# Patient Record
Sex: FEMALE | Race: WHITE | NOT HISPANIC OR LATINO | Employment: FULL TIME | URBAN - METROPOLITAN AREA
[De-identification: names, ages, dates, MRNs, and addresses within clinical notes are randomized per-mention and may not be internally consistent; named-entity substitution may affect disease eponyms.]

---

## 2017-01-14 ENCOUNTER — HOSPITAL ENCOUNTER (EMERGENCY)
Facility: HOSPITAL | Age: 32
Discharge: HOME | End: 2017-01-14
Admitting: EMERGENCY MEDICINE
Payer: COMMERCIAL

## 2017-01-14 VITALS
HEIGHT: 61 IN | OXYGEN SATURATION: 98 % | SYSTOLIC BLOOD PRESSURE: 146 MMHG | RESPIRATION RATE: 20 BRPM | DIASTOLIC BLOOD PRESSURE: 87 MMHG | TEMPERATURE: 97.4 F | WEIGHT: 120 LBS | BODY MASS INDEX: 22.66 KG/M2 | HEART RATE: 54 BPM

## 2017-01-14 DIAGNOSIS — K08.89 TOOTHACHE: Primary | ICD-10-CM

## 2017-01-14 PROCEDURE — 99283 EMERGENCY DEPT VISIT LOW MDM: CPT

## 2017-01-14 RX ORDER — NAPROXEN 500 MG/1
500 TABLET ORAL ONCE
Status: COMPLETED | OUTPATIENT
Start: 2017-01-14 | End: 2017-01-14

## 2017-01-14 RX ORDER — NAPROXEN 500 MG/1
500 TABLET ORAL 2 TIMES DAILY WITH MEALS
Qty: 60 TABLET | Refills: 0 | Status: SHIPPED | OUTPATIENT
Start: 2017-01-14 | End: 2017-02-13

## 2017-01-14 RX ORDER — TRAMADOL HYDROCHLORIDE 50 MG/1
50 TABLET ORAL EVERY 6 HOURS PRN
Qty: 6 TABLET | Refills: 0 | Status: SHIPPED | OUTPATIENT
Start: 2017-01-14 | End: 2017-01-24

## 2017-01-14 RX ORDER — PENICILLIN V POTASSIUM 500 MG/1
500 TABLET ORAL 4 TIMES DAILY
Qty: 40 TABLET | Refills: 0 | Status: SHIPPED | OUTPATIENT
Start: 2017-01-14 | End: 2017-01-24

## 2017-01-14 RX ADMIN — NAPROXEN 500 MG: 500 TABLET ORAL at 16:11

## 2017-07-25 ENCOUNTER — ALLSCRIPTS OFFICE VISIT (OUTPATIENT)
Dept: OTHER | Facility: OTHER | Age: 32
End: 2017-07-25

## 2017-07-25 ENCOUNTER — GENERIC CONVERSION - ENCOUNTER (OUTPATIENT)
Dept: OTHER | Facility: OTHER | Age: 32
End: 2017-07-25

## 2018-01-12 NOTE — MISCELLANEOUS
Message  Return to work or school:  Tray Obando is under my professional care  She was seen in my office on 7/25/2017  Please excuse form work 7/25/2017            Zane Alicea MD       Signatures   Electronically signed by : Zane Alicea MD; Jul 26 2017  3:25PM EST                       (Author)

## 2018-01-14 VITALS
TEMPERATURE: 98.6 F | BODY MASS INDEX: 20.12 KG/M2 | SYSTOLIC BLOOD PRESSURE: 110 MMHG | HEART RATE: 60 BPM | HEIGHT: 61 IN | WEIGHT: 106.6 LBS | DIASTOLIC BLOOD PRESSURE: 70 MMHG

## 2018-11-17 ENCOUNTER — OFFICE VISIT (OUTPATIENT)
Dept: URGENT CARE | Facility: CLINIC | Age: 33
End: 2018-11-17
Payer: COMMERCIAL

## 2018-11-17 VITALS
HEIGHT: 61 IN | WEIGHT: 121 LBS | HEART RATE: 68 BPM | BODY MASS INDEX: 22.84 KG/M2 | TEMPERATURE: 97.2 F | SYSTOLIC BLOOD PRESSURE: 115 MMHG | DIASTOLIC BLOOD PRESSURE: 73 MMHG

## 2018-11-17 DIAGNOSIS — K04.7 DENTAL ABSCESS: Primary | ICD-10-CM

## 2018-11-17 PROCEDURE — G0382 LEV 3 HOSP TYPE B ED VISIT: HCPCS | Performed by: PHYSICIAN ASSISTANT

## 2018-11-17 PROCEDURE — 99283 EMERGENCY DEPT VISIT LOW MDM: CPT | Performed by: PHYSICIAN ASSISTANT

## 2018-11-17 RX ORDER — NAPROXEN 500 MG/1
500 TABLET ORAL 2 TIMES DAILY WITH MEALS
Qty: 14 TABLET | Refills: 0 | Status: SHIPPED | OUTPATIENT
Start: 2018-11-17 | End: 2019-05-28 | Stop reason: ALTCHOICE

## 2018-11-17 RX ORDER — PENICILLIN V POTASSIUM 500 MG/1
500 TABLET ORAL EVERY 8 HOURS SCHEDULED
Qty: 30 TABLET | Refills: 0 | Status: SHIPPED | OUTPATIENT
Start: 2018-11-17 | End: 2018-11-27

## 2018-11-17 NOTE — PATIENT INSTRUCTIONS
Take antibiotic as directed  Yogurt avoid GI upset  Take naproxen as directed for pain  The food medication  Follow up with dentist on 11/28  Follow up with PCP in 1-2 days  Go to the ER for worsening symptoms

## 2018-11-17 NOTE — PROGRESS NOTES
[facility]        NAME: Alba Whitehead is a 29 y o  female  : 1985    MRN: [id number]      Assessment and Plan   Dental abscess [K04 7]  1  Dental abscess  naproxen (NAPROSYN) 500 mg tablet    penicillin V potassium (VEETID) 500 mg tablet         Patient Instructions     Patient Instructions   Take antibiotic as directed  Yogurt avoid GI upset  Take naproxen as directed for pain  The food medication  Follow up with dentist on   Follow up with PCP in 1-2 days  Go to the ER for worsening symptoms  Chief Complaint     Chief Complaint   Patient presents with    Jaw Pain     left side jaw pain from a broken tooth since monday         History of Present Illness       Dental Pain    This is a new problem  The current episode started yesterday  The problem occurs constantly  The problem has been unchanged  The pain is at a severity of 5/10  The pain is moderate  Associated symptoms include facial pain, sinus pressure and thermal sensitivity  Pertinent negatives include no difficulty swallowing, fever or oral bleeding  Associated symptoms comments: Patient reports she has not been the dentist since she was a teenager  Pain is located in upper left molars  Patient reports she believes the 1 tooth has slowly been breaking off  Patient reports she has an appointment with a plus denture see on   Jeimy Randall She has tried NSAIDs for the symptoms  The treatment provided mild relief  Review of Systems   Review of Systems   Constitutional: Negative for chills, fatigue and fever  HENT: Positive for dental problem and sinus pressure  Negative for congestion, ear pain, hearing loss, postnasal drip, sinus pain and sore throat  Eyes: Negative for pain and discharge  Respiratory: Negative for chest tightness and shortness of breath  Cardiovascular: Negative for chest pain  Gastrointestinal: Negative for abdominal pain, constipation, nausea and vomiting     Genitourinary: Negative for difficulty urinating  Musculoskeletal: Negative for arthralgias and myalgias  Skin: Negative for rash  Neurological: Negative for dizziness and headaches  Psychiatric/Behavioral: Negative for behavioral problems  Current Medications       Current Outpatient Prescriptions:     naproxen (NAPROSYN) 500 mg tablet, Take 1 tablet (500 mg total) by mouth 2 (two) times a day with meals for 7 days, Disp: 14 tablet, Rfl: 0    penicillin V potassium (VEETID) 500 mg tablet, Take 1 tablet (500 mg total) by mouth every 8 (eight) hours for 10 days, Disp: 30 tablet, Rfl: 0    Current Allergies     Allergies as of 11/17/2018    (No Known Allergies)            The following portions of the patient's history were reviewed and updated as appropriate: allergies, current medications, past family history, past medical history, past social history, past surgical history and problem list      Past Medical History:   Diagnosis Date    Cervical dysplasia, mild        No past surgical history on file  No family history on file  Medications have been verified  Objective   /73   Pulse 68   Temp (!) 97 2 °F (36 2 °C)   Ht 5' 1" (1 549 m)   Wt 54 9 kg (121 lb)   BMI 22 86 kg/m²        Physical Exam     Physical Exam   Constitutional: She is oriented to person, place, and time  She appears well-developed and well-nourished  HENT:   Head:       Mouth/Throat: Oropharynx is clear and moist and mucous membranes are normal  She does not have dentures  No oral lesions  Normal dentition  No dental abscesses or dental caries  Eyes: Pupils are equal, round, and reactive to light  Neck: Normal range of motion  Cardiovascular: Normal rate, regular rhythm and normal heart sounds  No murmur heard  Pulmonary/Chest: Effort normal and breath sounds normal  No respiratory distress  Neurological: She is alert and oriented to person, place, and time  Psychiatric: She has a normal mood and affect  Nursing note and vitals reviewed

## 2019-05-28 ENCOUNTER — OFFICE VISIT (OUTPATIENT)
Dept: FAMILY MEDICINE CLINIC | Facility: HOSPITAL | Age: 34
End: 2019-05-28
Payer: COMMERCIAL

## 2019-05-28 VITALS
DIASTOLIC BLOOD PRESSURE: 72 MMHG | WEIGHT: 125.8 LBS | SYSTOLIC BLOOD PRESSURE: 118 MMHG | BODY MASS INDEX: 23.75 KG/M2 | HEIGHT: 61 IN | TEMPERATURE: 97.3 F | HEART RATE: 54 BPM

## 2019-05-28 DIAGNOSIS — Z13.220 SCREENING CHOLESTEROL LEVEL: ICD-10-CM

## 2019-05-28 DIAGNOSIS — R53.83 FATIGUE, UNSPECIFIED TYPE: ICD-10-CM

## 2019-05-28 DIAGNOSIS — Z00.00 ANNUAL PHYSICAL EXAM: Primary | ICD-10-CM

## 2019-05-28 PROCEDURE — 99395 PREV VISIT EST AGE 18-39: CPT | Performed by: NURSE PRACTITIONER

## 2019-05-28 RX ORDER — MULTIVIT-MIN/IRON FUM/FOLIC AC 7.5 MG-4
1 TABLET ORAL DAILY
COMMUNITY
End: 2020-04-20

## 2020-04-20 ENCOUNTER — TELEMEDICINE (OUTPATIENT)
Dept: FAMILY MEDICINE CLINIC | Facility: HOSPITAL | Age: 35
End: 2020-04-20
Payer: COMMERCIAL

## 2020-04-20 VITALS — BODY MASS INDEX: 23.41 KG/M2 | WEIGHT: 124 LBS | HEIGHT: 61 IN

## 2020-04-20 DIAGNOSIS — Z20.828 EXPOSURE TO SARS-ASSOCIATED CORONAVIRUS: Primary | ICD-10-CM

## 2020-04-20 DIAGNOSIS — R19.7 DIARRHEA, UNSPECIFIED TYPE: ICD-10-CM

## 2020-04-20 DIAGNOSIS — Z20.828 EXPOSURE TO SARS-ASSOCIATED CORONAVIRUS: ICD-10-CM

## 2020-04-20 DIAGNOSIS — R53.83 OTHER FATIGUE: ICD-10-CM

## 2020-04-20 PROCEDURE — 99214 OFFICE O/P EST MOD 30 MIN: CPT | Performed by: NURSE PRACTITIONER

## 2020-04-20 PROCEDURE — 87635 SARS-COV-2 COVID-19 AMP PRB: CPT

## 2020-04-21 LAB — SARS-COV-2 RNA SPEC QL NAA+PROBE: NOT DETECTED

## 2020-04-24 ENCOUNTER — TELEPHONE (OUTPATIENT)
Dept: FAMILY MEDICINE CLINIC | Facility: HOSPITAL | Age: 35
End: 2020-04-24

## 2020-04-24 LAB
1,25(OH)2D3 SERPL-MCNC: 65.2 PG/ML (ref 19.9–79.3)
ALBUMIN SERPL-MCNC: 4.9 G/DL (ref 3.8–4.8)
ALBUMIN/GLOB SERPL: 2.5 {RATIO} (ref 1.2–2.2)
ALP SERPL-CCNC: 20 IU/L (ref 39–117)
ALT SERPL-CCNC: 15 IU/L (ref 0–32)
AST SERPL-CCNC: 18 IU/L (ref 0–40)
BASOPHILS # BLD AUTO: 0 X10E3/UL (ref 0–0.2)
BASOPHILS NFR BLD AUTO: 1 %
BILIRUB SERPL-MCNC: 0.4 MG/DL (ref 0–1.2)
BUN SERPL-MCNC: 7 MG/DL (ref 6–20)
BUN/CREAT SERPL: 10 (ref 9–23)
CALCIUM SERPL-MCNC: 9.7 MG/DL (ref 8.7–10.2)
CHLORIDE SERPL-SCNC: 102 MMOL/L (ref 96–106)
CHOLEST SERPL-MCNC: 179 MG/DL (ref 100–199)
CHOLEST/HDLC SERPL: 3.1 RATIO (ref 0–4.4)
CO2 SERPL-SCNC: 25 MMOL/L (ref 20–29)
CREAT SERPL-MCNC: 0.69 MG/DL (ref 0.57–1)
EOSINOPHIL # BLD AUTO: 0.1 X10E3/UL (ref 0–0.4)
EOSINOPHIL NFR BLD AUTO: 1 %
ERYTHROCYTE [DISTWIDTH] IN BLOOD BY AUTOMATED COUNT: 12.6 % (ref 11.7–15.4)
FERRITIN SERPL-MCNC: 78 NG/ML (ref 15–150)
GLOBULIN SER-MCNC: 2 G/DL (ref 1.5–4.5)
GLUCOSE SERPL-MCNC: 116 MG/DL (ref 65–99)
HCT VFR BLD AUTO: 38.3 % (ref 34–46.6)
HDLC SERPL-MCNC: 57 MG/DL
HGB BLD-MCNC: 13.2 G/DL (ref 11.1–15.9)
IMM GRANULOCYTES # BLD: 0 X10E3/UL (ref 0–0.1)
IMM GRANULOCYTES NFR BLD: 0 %
LDLC SERPL CALC-MCNC: 109 MG/DL (ref 0–99)
LYMPHOCYTES # BLD AUTO: 1.3 X10E3/UL (ref 0.7–3.1)
LYMPHOCYTES NFR BLD AUTO: 22 %
MCH RBC QN AUTO: 30.3 PG (ref 26.6–33)
MCHC RBC AUTO-ENTMCNC: 34.5 G/DL (ref 31.5–35.7)
MCV RBC AUTO: 88 FL (ref 79–97)
MONOCYTES # BLD AUTO: 0.5 X10E3/UL (ref 0.1–0.9)
MONOCYTES NFR BLD AUTO: 8 %
NEUTROPHILS # BLD AUTO: 4 X10E3/UL (ref 1.4–7)
NEUTROPHILS NFR BLD AUTO: 68 %
PLATELET # BLD AUTO: 246 X10E3/UL (ref 150–450)
POTASSIUM SERPL-SCNC: 4.4 MMOL/L (ref 3.5–5.2)
PROT SERPL-MCNC: 6.9 G/DL (ref 6–8.5)
RBC # BLD AUTO: 4.36 X10E6/UL (ref 3.77–5.28)
SL AMB EGFR AFRICAN AMERICAN: 132 ML/MIN/1.73
SL AMB EGFR NON AFRICAN AMERICAN: 115 ML/MIN/1.73
SL AMB VLDL CHOLESTEROL CALC: 13 MG/DL (ref 5–40)
SODIUM SERPL-SCNC: 140 MMOL/L (ref 134–144)
TRIGL SERPL-MCNC: 64 MG/DL (ref 0–149)
TSH SERPL DL<=0.005 MIU/L-ACNC: 1.38 UIU/ML (ref 0.45–4.5)
VIT B12 SERPL-MCNC: 416 PG/ML (ref 232–1245)
WBC # BLD AUTO: 5.9 X10E3/UL (ref 3.4–10.8)

## 2020-05-08 ENCOUNTER — TELEMEDICINE (OUTPATIENT)
Dept: FAMILY MEDICINE CLINIC | Facility: HOSPITAL | Age: 35
End: 2020-05-08
Payer: COMMERCIAL

## 2020-05-08 DIAGNOSIS — R51.9 ACUTE NONINTRACTABLE HEADACHE, UNSPECIFIED HEADACHE TYPE: Primary | ICD-10-CM

## 2020-05-08 PROCEDURE — 99213 OFFICE O/P EST LOW 20 MIN: CPT | Performed by: FAMILY MEDICINE

## 2020-05-12 ENCOUNTER — TELEPHONE (OUTPATIENT)
Dept: BEHAVIORAL/MENTAL HEALTH CLINIC | Facility: CLINIC | Age: 35
End: 2020-05-12

## 2020-05-12 ENCOUNTER — TELEMEDICINE (OUTPATIENT)
Dept: FAMILY MEDICINE CLINIC | Facility: HOSPITAL | Age: 35
End: 2020-05-12
Payer: COMMERCIAL

## 2020-05-12 VITALS — TEMPERATURE: 97.1 F | HEIGHT: 61 IN | WEIGHT: 124 LBS | BODY MASS INDEX: 23.41 KG/M2

## 2020-05-12 DIAGNOSIS — F41.1 GAD (GENERALIZED ANXIETY DISORDER): Primary | ICD-10-CM

## 2020-05-12 DIAGNOSIS — F32.0 CURRENT MILD EPISODE OF MAJOR DEPRESSIVE DISORDER WITHOUT PRIOR EPISODE (HCC): ICD-10-CM

## 2020-05-12 PROCEDURE — 99214 OFFICE O/P EST MOD 30 MIN: CPT | Performed by: NURSE PRACTITIONER

## 2020-05-12 RX ORDER — ESCITALOPRAM OXALATE 10 MG/1
10 TABLET ORAL DAILY
Qty: 30 TABLET | Refills: 1 | Status: SHIPPED | OUTPATIENT
Start: 2020-05-12 | End: 2020-06-15

## 2020-05-14 ENCOUNTER — TELEPHONE (OUTPATIENT)
Dept: FAMILY MEDICINE CLINIC | Facility: HOSPITAL | Age: 35
End: 2020-05-14

## 2020-06-09 ENCOUNTER — TELEPHONE (OUTPATIENT)
Dept: BEHAVIORAL/MENTAL HEALTH CLINIC | Facility: CLINIC | Age: 35
End: 2020-06-09

## 2020-06-09 ENCOUNTER — TELEMEDICINE (OUTPATIENT)
Dept: FAMILY MEDICINE CLINIC | Facility: HOSPITAL | Age: 35
End: 2020-06-09
Payer: COMMERCIAL

## 2020-06-09 VITALS — HEIGHT: 61 IN | BODY MASS INDEX: 22.66 KG/M2 | WEIGHT: 120 LBS

## 2020-06-09 DIAGNOSIS — F32.0 CURRENT MILD EPISODE OF MAJOR DEPRESSIVE DISORDER WITHOUT PRIOR EPISODE (HCC): ICD-10-CM

## 2020-06-09 DIAGNOSIS — F41.1 GAD (GENERALIZED ANXIETY DISORDER): Primary | ICD-10-CM

## 2020-06-09 PROCEDURE — 99213 OFFICE O/P EST LOW 20 MIN: CPT | Performed by: NURSE PRACTITIONER

## 2020-06-14 DIAGNOSIS — F32.0 CURRENT MILD EPISODE OF MAJOR DEPRESSIVE DISORDER WITHOUT PRIOR EPISODE (HCC): ICD-10-CM

## 2020-06-14 DIAGNOSIS — F41.1 GAD (GENERALIZED ANXIETY DISORDER): ICD-10-CM

## 2020-06-15 RX ORDER — ESCITALOPRAM OXALATE 10 MG/1
TABLET ORAL
Qty: 30 TABLET | Refills: 1 | Status: SHIPPED | OUTPATIENT
Start: 2020-06-15 | End: 2020-07-07 | Stop reason: SDUPTHER

## 2020-07-07 ENCOUNTER — TELEMEDICINE (OUTPATIENT)
Dept: FAMILY MEDICINE CLINIC | Facility: HOSPITAL | Age: 35
End: 2020-07-07
Payer: COMMERCIAL

## 2020-07-07 VITALS — BODY MASS INDEX: 23.22 KG/M2 | WEIGHT: 123 LBS | HEIGHT: 61 IN

## 2020-07-07 DIAGNOSIS — F41.1 GAD (GENERALIZED ANXIETY DISORDER): ICD-10-CM

## 2020-07-07 DIAGNOSIS — F32.0 CURRENT MILD EPISODE OF MAJOR DEPRESSIVE DISORDER WITHOUT PRIOR EPISODE (HCC): ICD-10-CM

## 2020-07-07 PROCEDURE — 3008F BODY MASS INDEX DOCD: CPT | Performed by: NURSE PRACTITIONER

## 2020-07-07 PROCEDURE — 99214 OFFICE O/P EST MOD 30 MIN: CPT | Performed by: NURSE PRACTITIONER

## 2020-07-07 PROCEDURE — 1036F TOBACCO NON-USER: CPT | Performed by: NURSE PRACTITIONER

## 2020-07-07 RX ORDER — ESCITALOPRAM OXALATE 20 MG/1
10 TABLET ORAL DAILY
Qty: 30 TABLET | Refills: 1 | Status: SHIPPED | OUTPATIENT
Start: 2020-07-07 | End: 2020-07-10 | Stop reason: SDUPTHER

## 2020-07-07 NOTE — ASSESSMENT & PLAN NOTE
Increased anxiety  Increase Lexapro to 20 mg  Advise she f/u with therapy  Can trial magnesium glycinate nightly  Continue with daily exercise  F/U in 4 weeks

## 2020-07-07 NOTE — PROGRESS NOTES
Virtual Regular Visit      Assessment/Plan:    Problem List Items Addressed This Visit        Other    SARAH (generalized anxiety disorder)     Increased anxiety  Increase Lexapro to 20 mg  Advise she f/u with therapy  Can trial magnesium glycinate nightly  Continue with daily exercise  F/U in 4 weeks  Relevant Medications    escitalopram (LEXAPRO) 20 mg tablet    Current mild episode of major depressive disorder without prior episode (HCC)     Depressive symptoms controlled for now  Relevant Medications    escitalopram (LEXAPRO) 20 mg tablet               Reason for visit is   Chief Complaint   Patient presents with    Anxiety     started Saturday    Virtual Regular Visit        Encounter provider FLEX Estevez    Provider located at [address] [place] [address] MD  [address] [place] [address]      Recent Visits  No visits were found meeting these conditions  Showing recent visits within past 7 days and meeting all other requirements     Today's Visits  Date Type Provider Dept   [facility]   Showing today's visits and meeting all other requirements     Future Appointments  No visits were found meeting these conditions  Showing future appointments within next 150 days and meeting all other requirements        The patient was identified by name and date of birth  Deyaniramonserrat Durant was informed that this is a telemedicine visit and that the visit is being conducted through [employer] and patient was informed that this is not a secure, HIPAA-complaint platform  She agrees to proceed     My office door was closed  No one else was in the room  She acknowledged consent and understanding of privacy and security of the video platform  The patient has agreed to participate and understands they can discontinue the visit at any time  Patient is aware this is a billable service  Subjective  Tunde Jackson is a 36 y o  female    Started with increased anxiety a few days ago  Increased worry and fear  Had diarrhea  Went outside  Pacing  Took a few hours to feel better  Talking to sister made her feel better  Felt cloudy and foggy  Has been doing well up until a few days ago on lexapro 10 mg  No triggers to increased anxiety  Grandfather did pass away over 1 week ago  Feels hopeless  Was referred to therapy but has not followed up  Started with increased fatigue again  Poor appetite  Past Medical History:   Diagnosis Date    Cervical dysplasia, mild     Current mild episode of major depressive disorder without prior episode ([facility] [address] ) 5/12/2020    Iron deficiency anemia        Past Surgical History:   Procedure Laterality Date    BREAST SURGERY Right     Lumpectomy     COLPOSCOPY         Current Outpatient Medications   Medication Sig Dispense Refill    escitalopram (LEXAPRO) 20 mg tablet Take 0 5 tablets (10 mg total) by mouth daily 30 tablet 1     No current facility-administered medications for this visit  No Known Allergies    Review of Systems   Constitutional: Positive for appetite change and fatigue  Psychiatric/Behavioral: Negative for dysphoric mood, sleep disturbance and suicidal ideas  The patient is nervous/anxious  Video Exam    Vitals:    07/07/20 1055   Weight: 55 8 kg (123 lb)   Height: 5' 1" (1 549 m)       Physical Exam   Constitutional: She is oriented to person, place, and time  She appears well-developed and well-nourished  No distress  Neurological: She is alert and oriented to person, place, and time  Psychiatric: She has a normal mood and affect  Her behavior is normal  Judgment and thought content normal    Vitals reviewed  As a result of this visit, I have not referred the patient for further respiratory evaluation      I spent 20 minutes directly with the patient during this visit    I have spent 20 minutes with Patient today in which greater than 50% of this time was spent in counseling/coordination of care regarding Risks and benefits of tx options, Intructions for management, Importance of tx compliance, Risk factor reductions and Impressions  VIRTUAL VISIT DISCLAIMER    Damion Bo acknowledges that she has consented to an online visit or consultation  She understands that the online visit is based solely on information provided by her, and that, in the absence of a face-to-face physical evaluation by the physician, the diagnosis she receives is both limited and provisional in terms of accuracy and completeness  This is not intended to replace a full medical face-to-face evaluation by the physician  Damion Soriano understands and accepts these terms

## 2020-07-10 DIAGNOSIS — F41.1 GAD (GENERALIZED ANXIETY DISORDER): ICD-10-CM

## 2020-07-10 DIAGNOSIS — F32.0 CURRENT MILD EPISODE OF MAJOR DEPRESSIVE DISORDER WITHOUT PRIOR EPISODE (HCC): ICD-10-CM

## 2020-07-10 RX ORDER — ESCITALOPRAM OXALATE 20 MG/1
20 TABLET ORAL DAILY
Qty: 30 TABLET | Refills: 1 | Status: SHIPPED | OUTPATIENT
Start: 2020-07-10 | End: 2020-08-06 | Stop reason: SDUPTHER

## 2020-07-10 NOTE — TELEPHONE ENCOUNTER
Pt picked up rx but realized that its only half of the lexapro she needs because Aurora Isaac just upped her dosage recently, could we please call pharmacy and fix

## 2020-08-04 ENCOUNTER — TELEMEDICINE (OUTPATIENT)
Dept: BEHAVIORAL/MENTAL HEALTH CLINIC | Facility: CLINIC | Age: 35
End: 2020-08-04
Payer: COMMERCIAL

## 2020-08-04 DIAGNOSIS — F43.23 ADJUSTMENT DISORDER WITH MIXED ANXIETY AND DEPRESSED MOOD: Primary | ICD-10-CM

## 2020-08-04 PROCEDURE — 90834 PSYTX W PT 45 MINUTES: CPT | Performed by: SOCIAL WORKER
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
## 2020-08-06 ENCOUNTER — TELEMEDICINE (OUTPATIENT)
Dept: FAMILY MEDICINE CLINIC | Facility: HOSPITAL | Age: 35
End: 2020-08-06
Payer: COMMERCIAL

## 2020-08-06 VITALS — BODY MASS INDEX: 24.17 KG/M2 | HEIGHT: 61 IN | WEIGHT: 128 LBS

## 2020-08-06 DIAGNOSIS — F32.0 CURRENT MILD EPISODE OF MAJOR DEPRESSIVE DISORDER WITHOUT PRIOR EPISODE (HCC): Primary | ICD-10-CM

## 2020-08-06 DIAGNOSIS — F41.1 GAD (GENERALIZED ANXIETY DISORDER): ICD-10-CM

## 2020-08-06 PROCEDURE — 1036F TOBACCO NON-USER: CPT | Performed by: NURSE PRACTITIONER

## 2020-08-06 PROCEDURE — 99213 OFFICE O/P EST LOW 20 MIN: CPT | Performed by: NURSE PRACTITIONER

## 2020-08-06 PROCEDURE — 3008F BODY MASS INDEX DOCD: CPT | Performed by: NURSE PRACTITIONER

## 2020-08-06 RX ORDER — ESCITALOPRAM OXALATE 20 MG/1
20 TABLET ORAL DAILY
Qty: 30 TABLET | Refills: 2 | Status: SHIPPED | OUTPATIENT
Start: 2020-08-06 | End: 2020-12-08 | Stop reason: SDUPTHER

## 2020-08-06 NOTE — PROGRESS NOTES
Virtual Regular Visit      Assessment/Plan:    Problem List Items Addressed This Visit        Other    SARAH (generalized anxiety disorder)     Much improved  Continue on lexapro 20 mg    F/U in 2 months (in person)  Continue with therapy  Call with worsening mood  Current mild episode of major depressive disorder without prior episode ([facility] [address] ) - Primary     Much improved  Continue on lexapro 20 mg    F/U in 2 months (in person)  Continue with therapy  Call with worsening mood  Reason for visit is   Chief Complaint   Patient presents with    Follow-up    Virtual Regular Visit        Encounter provider FLEX Ulrich    Provider located at [address] [place] [address]  [address] [place] [address]      Recent Visits  No visits were found meeting these conditions  Showing recent visits within past 7 days and meeting all other requirements     Today's Visits  Date Type Provider Dept   [facility]   Showing today's visits and meeting all other requirements     Future Appointments  No visits were found meeting these conditions  Showing future appointments within next 150 days and meeting all other requirements        The patient was identified by name and date of birth  Lydia Medeiros was informed that this is a telemedicine visit and that the visit is being conducted through [employer] and patient was informed that this is not a secure, HIPAA-complaint platform  She agrees to proceed     My office door was closed  No one else was in the room  She acknowledged consent and understanding of privacy and security of the video platform  The patient has agreed to participate and understands they can discontinue the visit at any time  Patient is aware this is a billable service  Subjective  Lydia Medeiros is a 30 y o  female    Feeling better   Still will get waves of anxiety but not lasting and not daily  Had increased lexapro to 20 mg  Appetite has improved  Had therapy session with Tricia 2 days ago  Plan is for f/u shane 3 weeks  Has been taking magnesium glycinate every day  Denies any AE with lexapro  Also feels more motivated with a set schedule  Has been spending time with her grandmother and making more time for herself  Past Medical History:   Diagnosis Date    Cervical dysplasia, mild     Current mild episode of major depressive disorder without prior episode ([facility] [address] ) 5/12/2020    Iron deficiency anemia        Past Surgical History:   Procedure Laterality Date    BREAST SURGERY Right     Lumpectomy     COLPOSCOPY         Current Outpatient Medications   Medication Sig Dispense Refill    escitalopram (LEXAPRO) 20 mg tablet Take 1 tablet (20 mg total) by mouth daily 30 tablet 1     No current facility-administered medications for this visit  No Known Allergies    Review of Systems   Constitutional: Negative for activity change, appetite change, fatigue and unexpected weight change  Psychiatric/Behavioral: Negative for agitation, decreased concentration, dysphoric mood, sleep disturbance and suicidal ideas  The patient is not nervous/anxious  Video Exam    Vitals:    08/06/20 1058   Weight: 58 1 kg (128 lb)   Height: 5' 1" (1 549 m)       Physical Exam   Constitutional: She is oriented to person, place, and time  Abdominal: Normal appearance  Neurological: She is alert and oriented to person, place, and time  Psychiatric: Her behavior is normal  Mood, judgment and thought content normal         I spent 15 minutes directly with the patient during this visit      Gita acknowledges that she has consented to an online visit or consultation   She understands that the online visit is based solely on information provided by her, and that, in the absence of a face-to-face physical evaluation by the physician, the diagnosis she receives is both limited and provisional in terms of accuracy and completeness  This is not intended to replace a full medical face-to-face evaluation by the physician  Jj Asencio understands and accepts these terms

## 2020-08-06 NOTE — ASSESSMENT & PLAN NOTE
Much improved  Continue on lexapro 20 mg    F/U in 2 months (in person)  Continue with therapy  Call with worsening mood

## 2020-08-10 ENCOUNTER — TELEMEDICINE (OUTPATIENT)
Dept: FAMILY MEDICINE CLINIC | Facility: HOSPITAL | Age: 35
End: 2020-08-10
Payer: COMMERCIAL

## 2020-08-10 VITALS — HEIGHT: 61 IN | BODY MASS INDEX: 24.17 KG/M2 | WEIGHT: 128 LBS

## 2020-08-10 DIAGNOSIS — F41.1 GAD (GENERALIZED ANXIETY DISORDER): Primary | ICD-10-CM

## 2020-08-10 PROCEDURE — 99214 OFFICE O/P EST MOD 30 MIN: CPT | Performed by: NURSE PRACTITIONER

## 2020-08-10 PROCEDURE — 1036F TOBACCO NON-USER: CPT | Performed by: NURSE PRACTITIONER

## 2020-08-10 PROCEDURE — 3008F BODY MASS INDEX DOCD: CPT | Performed by: NURSE PRACTITIONER

## 2020-08-10 RX ORDER — ALPRAZOLAM 0.25 MG/1
0.25 TABLET ORAL 2 TIMES DAILY PRN
Qty: 15 TABLET | Refills: 0 | Status: SHIPPED | OUTPATIENT
Start: 2020-08-10 | End: 2022-01-06 | Stop reason: SDUPTHER

## 2020-08-10 NOTE — PROGRESS NOTES
Virtual Regular Visit      Assessment/Plan:    Problem List Items Addressed This Visit        Other    SARAH (generalized anxiety disorder) - Primary     Worsening anxiety over the last 2 days  Anxiety seems to be triggered by any physical symptoms she experiences triggering an anxiety cycle  Has had intake with therapist and next appointment is 8/18  Will prescribe low dose benzodiazepine to help with panic attacks  PDMP accessed and no red flags  Discussed risk of tolerance, dependence and to use sparingly  F/U in 4 weeks  Relevant Medications    ALPRAZolam (XANAX) 0 25 mg tablet               Reason for visit is   Chief Complaint   Patient presents with    Anxiety    Depression    Virtual Regular Visit        Encounter provider Helen Ayala, [facility]    Provider located at [address] [facility]  [address] [place] [address]      Recent Visits  Date Type Provider Dept   08/06/20 [facility] [facility]   Showing recent visits within past 7 days and meeting all other requirements     Today's Visits  Date Type Provider Dept   08/10/20 [facility] [facility]   Showing today's visits and meeting all other requirements     Future Appointments  No visits were found meeting these conditions  Showing future appointments within next 150 days and meeting all other requirements        The patient was identified by name and date of birth  Damion Soriano was informed that this is a telemedicine visit and that the visit is being conducted through "[employer] and patient was informed that this is not a secure, HIPAA-complaint platform  She agrees to proceed     My office door was closed  No one else was in the room  She acknowledged consent and understanding of privacy and security of the video platform   The patient has agreed to participate and understands they can discontinue the visit at any time     Patient is aware this is a billable service  Subjective  Mona Leader is a 30 y o  female    2 days ago started with HA  Yesterday woke and felt good  Felt full and started to feel sick  Started to have panic attack  Couldn't get it to go away  Slept for 3 hours  Last night slept fine  Having flutters of anxiety  Crying  Feels like a weight in the back of her head  Had med check last week and felt anxiety and depression were very well controlled  Had intake with Antoni rGady and appointment scheduled for next week  Has been taking magnesium and daily walks  Currently on 20 mg lexapro which has been beneficial  Similar issues before starting lexapro and thought she had COVID  That is when all the anxiety started  Past Medical History:   Diagnosis Date    Cervical dysplasia, mild     Current mild episode of major depressive disorder without prior episode ([facility] [address] ) 5/12/2020    Iron deficiency anemia        Past Surgical History:   Procedure Laterality Date    BREAST SURGERY Right     Lumpectomy     COLPOSCOPY         Current Outpatient Medications   Medication Sig Dispense Refill    escitalopram (LEXAPRO) 20 mg tablet Take 1 tablet (20 mg total) by mouth daily 30 tablet 2    ALPRAZolam (XANAX) 0 25 mg tablet Take 1 tablet (0 25 mg total) by mouth 2 (two) times a day as needed for anxiety 15 tablet 0     No current facility-administered medications for this visit  No Known Allergies    Review of Systems   Constitutional: Positive for appetite change and fatigue  Negative for chills and fever  Gastrointestinal: Positive for nausea  Neurological: Positive for headaches  Psychiatric/Behavioral: Positive for dysphoric mood  Negative for sleep disturbance and suicidal ideas  The patient is nervous/anxious  Video Exam    Vitals:    08/10/20 1503   Weight: 58 1 kg (128 lb)   Height: 5' 1" (1 549 m)       Physical Exam  Vitals signs reviewed     Constitutional:       General: She is not in acute distress  Appearance: She is not ill-appearing  Pulmonary:      Effort: Pulmonary effort is normal    Neurological:      Mental Status: She is alert and oriented to person, place, and time  Psychiatric:         Mood and Affect: Mood normal          Behavior: Behavior normal          Thought Content: Thought content normal          Judgment: Judgment normal           I spent 20 minutes with patient today in which greater than 50% of the time was spent in counseling/coordination of care regarding medications, impressions  VIRTUAL VISIT DISCLAIMER    Ally Stacy acknowledges that she has consented to an online visit or consultation  She understands that the online visit is based solely on information provided by her, and that, in the absence of a face-to-face physical evaluation by the physician, the diagnosis she receives is both limited and provisional in terms of accuracy and completeness  This is not intended to replace a full medical face-to-face evaluation by the physician  Ally Stacy understands and accepts these terms

## 2020-08-10 NOTE — ASSESSMENT & PLAN NOTE
Worsening anxiety over the last 2 days  Anxiety seems to be triggered by any physical symptoms she experiences triggering an anxiety cycle  Has had intake with therapist and next appointment is 8/18  Will prescribe low dose benzodiazepine to help with panic attacks  PDMP accessed and no red flags  Discussed risk of tolerance, dependence and to use sparingly  F/U in 4 weeks

## 2020-08-18 ENCOUNTER — TELEMEDICINE (OUTPATIENT)
Dept: BEHAVIORAL/MENTAL HEALTH CLINIC | Facility: CLINIC | Age: 35
End: 2020-08-18
Payer: COMMERCIAL

## 2020-08-18 DIAGNOSIS — F43.23 ADJUSTMENT DISORDER WITH MIXED ANXIETY AND DEPRESSED MOOD: Primary | ICD-10-CM

## 2020-08-18 PROCEDURE — 90834 PSYTX W PT 45 MINUTES: CPT | Performed by: SOCIAL WORKER

## 2020-08-18 NOTE — PSYCH
Virtual Regular Visit    Assessment/Plan:    Problem List Items Addressed This Visit        Other    Adjustment disorder with mixed anxiety and depressed mood - Primary        Reason for visit is   Chief Complaint   Patient presents with    Virtual Regular Visit      Encounter provider Ilana Vargas    Provider located at [address] [place] [id number]  [phone]    Recent Visits  No visits were found meeting these conditions  Showing recent visits within past 7 days and meeting all other requirements     Today's Visits  Date Type Provider Dept   08/18/20 Telemedicine Tricia DOE [address] [place] [address] [facility]   Showing today's visits and meeting all other requirements     Future Appointments  No visits were found meeting these conditions  Showing future appointments within next 150 days and meeting all other requirements      The patient was identified by name and date of birth  Mikie Diaz was informed that this is a telemedicine visit and that the visit is being conducted through [employer] and patient was informed that this is not a secure, HIPAA-complaint platform  She agrees to proceed     My office door was closed  No one else was in the room  She acknowledged consent and understanding of privacy and security of the video platform  The patient has agreed to participate and understands they can discontinue the visit at any time  Patient is aware this is a billable service  Subjective  Mikie Diaz is a 30 y o  female      HPI     Past Medical History:   Diagnosis Date    Cervical dysplasia, mild     Current mild episode of major depressive disorder without prior episode ([facility] [address] ) 5/12/2020    Iron deficiency anemia      Past Surgical History:   Procedure Laterality Date    BREAST SURGERY Right     Lumpectomy     COLPOSCOPY       Current Outpatient Medications   Medication Sig Dispense Refill    ALPRAZolam (XANAX) 0 25 mg tablet Take 1 tablet (0 25 mg total) by mouth 2 (two) times a day as needed for anxiety 15 tablet 0    escitalopram (LEXAPRO) 20 mg tablet Take 1 tablet (20 mg total) by mouth daily 30 tablet 2     No current facility-administered medications for this visit  No Known Allergies    Review of Systems    Video Exam    There were no vitals filed for this visit  Physical Exam     (D) Rogelio attended her follow up psychotherapy session today  Rogelio reports that since her last session, she struggled with a panic attack last Sunday  Rogelio reports that she isn't able to identify any triggers in relation to this, and reports that she had elevated symptoms of anxiety, and depressive symptoms over the past week, which Rogelio reports is common for her after she experiences a panic attack  Rogelio reported that these symptoms lasted a week, and reported that she struggled with getting out of bed, showering, and taking care of herself, resulting in her needing additional support from her , and family  Rogelio reports that she is feeling a decrease in symptoms now  Rogelio reports that her menstrual cycle ended the following Monday, the day after her panic attack, and spent time discussing difficulties she has with her menstrual cycle, describing herself as having a history of heavy periods, physical, pain, cramping, and symptoms surrounding this  Rogelio reports that her grandfather passed away (2) months ago, and describes struggling with grief in relation to this  Rogelio reports that she makes an effort to visit her grandmother, and spend time with her, and go in her pool, but reports that she struggles to follow through with this when experiencing an increase in symptoms   Rogelio reports that as a child she identified her grandparents house as a safe place growing up, and reports that she would like to spend more time there  Rogelio reports that she struggles with motivation in the moment when feeling anxiety  Rogelio communicated that recently she had labwork that was done  Rogelio reports that she started taking a women's vitamin, due to her labwork of her B6  Being slightly low, and reports that she started taking magnesium  Tunkyrie described fluctuating symptoms, struggling with negative self-talk  Rogelio and this writer processed this at length  Discussed ongoing skills  Reviewed limits and boundaries  Modeled effective forms of communication  Provided ongoing psychoeducation  (A) Rogelio presented with good eye contact  Rogelio presented as alert and oriented x3  Ally's speech presented at a normal rate, volume, and rhythm  Rogelio denies that she is experiencing any evident or immediate risk factors for self-harm, SI, or HI  Rogelio presented as forward thinking, discussed upcoming plans, and reasons to live  Rogelio denies any symptoms of nehemiah, grandiose thinking, impulsivity, or elevated/ hyperactive moods  Rogelio does not appear to be endorsing criteria for nehemiah at this time  Rogelio reports having a panic attack since her last session, reporting increased nervousness, worrying, and anxiety  Rogelio describes symptoms of sadness, depression, irritability, and poor frustration tolerance  Rogelio describes symptoms of obsessive, intrusive, ruminating, and repetitive thoughts, reporting feeling tired, having little energy, reporting increased crying since her last session  Ally's mood presented as anxious and depressed, and her affect appeared to be congruent  Rogelio describes symptoms of fatigue, and a headache a few days before this most recent panic attack, describing possible psychosomatic symptoms   Rogelio reported that she struggled with getting out of bed a few days, reporting that she wore the same clothing for (3) days, and didn't shower for a few days, reporting that the only thing she did in relation to personal hygiene was brushing her teeth daily  Rogelio describes symptoms of negative self-talk, and describes herself as feeling highly critical of herself  Rogelio describes symptoms of grief in relation to actual loss by death and symbolic loss  (P) Rogelio plans to download a meditation/ mindfulness application on her phone, in order to start utilizing deep breathing techniques  Eduardokyrie plans to work on spending time outside, working in her garden, and being present in the moment  Pernellia plans to prioritize her mental health needs, increase the use of self-care, and actively take time for herself  Rogelio plans to implement limits and boundaries, assert herself, and advocate for herself  Pernellia plans to work on identifying coping skills, grounding techniques, and distraction techniques to target fluctuating symptoms, cycles, and stressors  Pernellia plans to specifically work on building a sensory toolkit, specifically geared towards distress tolerance skills  Rogelio plans to download a menstrual cycle tracker on her phone, and track her mental health symptoms in relation to this to gather further data and understanding of her fluctuating symptoms  Rogelio plans to work through the stages of grief, and allow herself to feel what she is feeling  Rogelio plans to increase effective forms of communication  Rogelio plans to lean into natural supports  Pernellia plans to increase the use of positive self-talk, and challenge cognitive distortions, deciphering between facts verses feelings with reality testing  Rogelio plans to utilize PRN medication as appropriate  Rogelio plans to outreach for additional support as needed  I spent 45 minutes directly with the patient during this visit  VIRTUAL VISIT DISCLAIMER    Ria Ball acknowledges that she has consented to an online visit or consultation   She understands that the online visit is based solely on information provided by her, and that, in the absence of a face-to-face physical evaluation by the physician, the diagnosis she receives is both limited and provisional in terms of accuracy and completeness  This is not intended to replace a full medical face-to-face evaluation by the physician  Quinten Durant understands and accepts these terms

## 2020-09-03 ENCOUNTER — TELEMEDICINE (OUTPATIENT)
Dept: BEHAVIORAL/MENTAL HEALTH CLINIC | Facility: CLINIC | Age: 35
End: 2020-09-03
Payer: COMMERCIAL

## 2020-09-03 DIAGNOSIS — F43.23 ADJUSTMENT DISORDER WITH MIXED ANXIETY AND DEPRESSED MOOD: Primary | ICD-10-CM

## 2020-09-03 PROCEDURE — 90834 PSYTX W PT 45 MINUTES: CPT | Performed by: SOCIAL WORKER

## 2020-09-03 NOTE — PSYCH
Virtual Regular Visit    Assessment/Plan:    Problem List Items Addressed This Visit        Other    Adjustment disorder with mixed anxiety and depressed mood - Primary        Reason for visit is   Chief Complaint   Patient presents with    Virtual Regular Visit      Encounter provider Kenneth Wiggins    Provider located at [address] [facility] [address] [phone]    Recent Visits  No visits were found meeting these conditions  Showing recent visits within past 7 days and meeting all other requirements     Today's Visits  Date Type Provider Dept   09/03/20 Telemedicine Tricia [facility]   Showing today's visits and meeting all other requirements     Future Appointments  No visits were found meeting these conditions  Showing future appointments within next 150 days and meeting all other requirements      The patient was identified by name and date of birth  Quinten Durant was informed that this is a telemedicine visit and that the visit is being conducted through [employer] and patient was informed that this is not a secure, HIPAA-complaint platform  She agrees to proceed     My office door was closed  No one else was in the room  She acknowledged consent and understanding of privacy and security of the video platform  The patient has agreed to participate and understands they can discontinue the visit at any time  Patient is aware this is a billable service  Subjective  Quinten Durant is a 30 y o  female        HPI     Past Medical History:   Diagnosis Date    Cervical dysplasia, mild     Current mild episode of major depressive disorder without prior episode ([facility] [address] ) 5/12/2020    Iron deficiency anemia        Past Surgical History:   Procedure Laterality Date    BREAST SURGERY Right     Lumpectomy     COLPOSCOPY         Current Outpatient Medications   Medication Sig Dispense Refill    ALPRAZolam (XANAX) 0 25 mg tablet Take 1 tablet (0 25 mg total) by mouth 2 (two) times a day as needed for anxiety 15 tablet 0    escitalopram (LEXAPRO) 20 mg tablet Take 1 tablet (20 mg total) by mouth daily 30 tablet 2     No current facility-administered medications for this visit  No Known Allergies    Review of Systems    Video Exam    There were no vitals filed for this visit  Physical Exam     (D) Rogelio attended her follow up psychotherapy session today  Rogelio reports that since her last session, she has noticed an ongoing decrease in the intensity and frequency of her symptoms  Rogelio describes symptoms of feeling tired and having little energy, reporting feeling lethargic all the time  Rogelio reports that overall she feels that she has been doing well, and reports that she was triggered yesterday by a minor incident with her son yesterday, where he left the house, snuck into the car, and Rogelio didn't realize he was in the car, until she was driving and he told her she was in the car  Rogelio discussed how anxiety provoking this was, discussed fear in relation to this, worrying, nervousness, shame, and guilt  Rogelio discussed and processed how she handled the situation  Rogelio discussed psychosocial stressors related to her children starting virtual school in the near future  Rogelio described some interpersonal relationship stressors with her and her   Rogelio and this writer processed this at length  Modeled effective forms of communication  Reviewed limits and boundaries  Provided ongoing psychoeducation  Discussed ongoing skills  (A) Rogelio describes herself as feeling tired and having little energy, describing herself as lethargic  Rogelio describes symptoms of sadness, depression, worrying, and anxiety   Rogelio describes ongoing symptoms of irritability, and poor frustration tolerance  Ally's mood presented as anxious and depressed and her affect appeared to be congruent  Ally's speech presented at a normal rate, volume, and rhythm  [place] presented as alert and oriented x3  [place] presented with good eye contact  [place] denies any symptoms of impulsivity, nehemiah, grandiose thinking, or elevated/ hyperactive moods  [place] does not appear to be endorsing criteria for nehemiah at this time  [place] denies any evident or immediate risk factors for self-harm, SI, or HI  [place] presented as forward thinking during session, discussing upcoming plans, and identified reasons to live  Tunkyrie describes lack of energy, and low motivation  (P) Tunisia plans to follow up with her PCP next week for a (4) week follow up to discuss/ review medications, and discuss sleep concerns  Tunisia plans to download a sleep cycle candie on her phone to gather data on her sleeping  [place] plans to continue to track her panic attacks, and menstrual cycles  [place] plans to continue to work on actively being present in the moment  [place] plans to increase mindfulness and deep breathing techniques  [place] plans to continue to read, and spend time outside  [place] plans to prioritize her mental health needs and medical needs  [place] plans to continue to implement limits and boundaries  [place] plans to assert herself and advocate for herself  [place] plans to explore unmet needs, ask for what she needs, and utilize effective forms of communication to express herself  T[place]isia plans to target fluctuating symptoms, cycles, and stressors with ongoing distraction techniques, grounding techniques, coping skills, and distress tolerance skills  [place] plans to focus on what she has control over, utilizing opposite action skills, and practice radical acceptance   [place] plans to increase the use of positive self-talk, and work on challenging cognitive distortions, deciphering between facts verses feelings with the use of reality testing  [facility] plans to outreach for additional support as needed  I spent 50 minutes directly with the patient during this visit  VIRTUAL VISIT DISCLAIMER    Kim Anaya acknowledges that she has consented to an online visit or consultation  She understands that the online visit is based solely on information provided by her, and that, in the absence of a face-to-face physical evaluation by the physician, the diagnosis she receives is both limited and provisional in terms of accuracy and completeness  This is not intended to replace a full medical face-to-face evaluation by the physician  Kim Anaya understands and accepts these terms

## 2020-09-09 ENCOUNTER — OFFICE VISIT (OUTPATIENT)
Dept: FAMILY MEDICINE CLINIC | Facility: HOSPITAL | Age: 35
End: 2020-09-09
Payer: COMMERCIAL

## 2020-09-09 VITALS
BODY MASS INDEX: 23.64 KG/M2 | WEIGHT: 125.2 LBS | HEIGHT: 61 IN | DIASTOLIC BLOOD PRESSURE: 74 MMHG | TEMPERATURE: 98.6 F | OXYGEN SATURATION: 98 % | SYSTOLIC BLOOD PRESSURE: 126 MMHG | HEART RATE: 62 BPM

## 2020-09-09 DIAGNOSIS — F32.0 CURRENT MILD EPISODE OF MAJOR DEPRESSIVE DISORDER WITHOUT PRIOR EPISODE (HCC): ICD-10-CM

## 2020-09-09 DIAGNOSIS — R40.0 DAYTIME SLEEPINESS: ICD-10-CM

## 2020-09-09 DIAGNOSIS — F41.1 GAD (GENERALIZED ANXIETY DISORDER): Primary | ICD-10-CM

## 2020-09-09 PROCEDURE — 99214 OFFICE O/P EST MOD 30 MIN: CPT | Performed by: NURSE PRACTITIONER

## 2020-09-09 NOTE — PROGRESS NOTES
Assessment/Plan:    SARAH (generalized anxiety disorder)  Better controlled  Long discussion with pt regarding anxiety, hormones, sleep etc    At this point I do not think further blood work would be beneficial    No change to current regimen  Continue with therapy  F/U in 3 months  Current mild episode of major depressive disorder without prior episode ([address] )  Controlled  Continue on current regimen  Diagnoses and all orders for this visit:    SARAH (generalized anxiety disorder)    Current mild episode of major depressive disorder without prior episode ([facility] [address] )    Daytime sleepiness  Comments:  Issues with sleep quality  Refer to sleep medicine  Orders:  -     Ambulatory referral to Sleep Medicine; Future        I have spent 30 minutes with Patient  today in which greater than 50% of this time was spent in counseling/coordination of care regarding Risks and benefits of tx options, Risk factor reductions and Impressions  Subjective:      Patient ID: Bry Hernandez is a 30 y o  female  Has been better  Anxiety seems worse in morning and afternoon  Nights are best  Seems to get bad every 5-6 weeks  Has been doing more positive self talk  Still seeing therapist  Has not tried Xanax yet  Has been tracking mood and period  No correlation as yet  Does report heavy painful periods  Has appointment with GYN in October  Does not sleep well  Vivid dreams  Never feels rested  Has had many years of feeling tired  H/o Lyme  Started before that  Feels tired all day  Wakes feeling tired  Never feels refreshed  The following portions of the patient's history were reviewed and updated as appropriate: allergies, current medications, past family history, past medical history, past social history, past surgical history and problem list     Review of Systems   Constitutional: Positive for fatigue  Negative for activity change, appetite change and unexpected weight change     Psychiatric/Behavioral: Positive for sleep disturbance  Negative for dysphoric mood and suicidal ideas  The patient is nervous/anxious  Objective:  Vitals:    09/09/20 1119   BP: 126/74   Pulse: 62   Temp: 98 6 °F (37 °C)   SpO2: 98%      Physical Exam  Vitals signs reviewed  Constitutional:       Appearance: Normal appearance  She is normal weight  Cardiovascular:      Rate and Rhythm: Normal rate  Heart sounds: Normal heart sounds  No murmur  Pulmonary:      Effort: Pulmonary effort is normal       Breath sounds: Normal breath sounds  Skin:     General: Skin is warm and dry  Neurological:      Mental Status: She is alert and oriented to person, place, and time  Psychiatric:         Mood and Affect: Mood normal          Behavior: Behavior normal          Thought Content:  Thought content normal          Judgment: Judgment normal

## 2020-09-16 ENCOUNTER — TELEMEDICINE (OUTPATIENT)
Dept: BEHAVIORAL/MENTAL HEALTH CLINIC | Facility: CLINIC | Age: 35
End: 2020-09-16
Payer: COMMERCIAL

## 2020-09-16 DIAGNOSIS — F43.23 ADJUSTMENT DISORDER WITH MIXED ANXIETY AND DEPRESSED MOOD: Primary | ICD-10-CM

## 2020-09-16 PROCEDURE — 90834 PSYTX W PT 45 MINUTES: CPT | Performed by: SOCIAL WORKER

## 2020-09-16 NOTE — PSYCH
Virtual Regular Visit    Assessment/Plan:    Problem List Items Addressed This Visit        Other    Adjustment disorder with mixed anxiety and depressed mood - Primary        Reason for visit is   Chief Complaint   Patient presents with    Virtual Regular Visit      Encounter provider Sue Green    Provider located at [address] [address] [phone]    Recent Visits  Date Type Provider Dept   09/09/20 Office Visit [facility]   Showing recent visits within past 7 days and meeting all other requirements     Today's Visits  Date Type Provider Dept   09/16/20 Telemedicine [facility]   Showing today's visits and meeting all other requirements     Future Appointments  No visits were found meeting these conditions  Showing future appointments within next 150 days and meeting all other requirements        The patient was identified by name and date of birth  Librado Spaulding was informed that this is a telemedicine visit and that the visit is being conducted through [employer] and patient was informed that this is not a secure, HIPAA-complaint platform  She agrees to proceed     My office door was closed  No one else was in the room  She acknowledged consent and understanding of privacy and security of the video platform  The patient has agreed to participate and understands they can discontinue the visit at any time  Patient is aware this is a billable service  Subjective  Librado Spaulding is a 30 y o  female        HPI     Past Medical History:   Diagnosis Date    Cervical dysplasia, mild     Current mild episode of major depressive disorder without prior episode ([facility] [address] ) 5/12/2020    Iron deficiency anemia        Past Surgical History:   Procedure Laterality Date    BREAST SURGERY Right     Lumpectomy     COLPOSCOPY Current Outpatient Medications   Medication Sig Dispense Refill    ALPRAZolam (XANAX) 0 25 mg tablet Take 1 tablet (0 25 mg total) by mouth 2 (two) times a day as needed for anxiety 15 tablet 0    escitalopram (LEXAPRO) 20 mg tablet Take 1 tablet (20 mg total) by mouth daily 30 tablet 2     No current facility-administered medications for this visit  No Known Allergies    Review of Systems    Video Exam    There were no vitals filed for this visit  Physical Exam     (D) Rogelio attended her follow up psychotherapy session today  Rogelio reports that since her last session, she reports having noticed a decrease in the intensity and frequency of her symptoms Rogelio reports that it has been (5) weeks since she had a panic attack  Rogelio reports that she continues to track her symptoms, cycles, and stressors  Rogelio reports that she has been utilizing ongoing skills, increasing deep breathing techniques, practicing distress tolerance skills, and grounding techniques, and reports that she has been challenging negative thoughts, and increasing positive self-talk  Rogelio discussed psychosocial stressors related to doing virtual 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, being out of work, and managing her mental health symptoms  Eduardokyrie reported that she did have her follow up with her PCP  Eduardokyrie reports that her PCP didn't feel that anymore labwork or testing was needed for medical causes to her fatigue, and reported that she told Pernellia that she wants her to just continue with therapy and her medication at this time  Rogelio and this writer processed this at length  Provided ongoing psychoeducation  Discussed ongoing skills  Reviewed limits and boundaries  Modeled effective forms of communication  (A) Eduardokyrie denies any evident or immediate risk factors for self-harm, SI, or HI  Rogelio presented as forward thinking, discussed upcoming plans, and identified reasons to live   Rogelio does not appear to be endorsing criteria for nehemiah at this time, denies symptoms of impulsivity, nehemiah, grandiose thinking, or elevated/ hyperactive moods  [place] presented with good eye contact  [place] presented as alert and oriented x3  Ally's speech presented at a normal rate, volume, and rhythm  Rogelio reports a decrease in the intensity and frequency of her symptoms since last session  Rogelio describes some symptoms of nervousness, worrying, anxiety, some sadness, and depression  Rogelio describes symptoms of irritability at times  Ally's mood presented as anxious and slightly down and her affect appeared to be congruent  Tunkyrie describes symptoms of feeling tired and having little energy  (P) Eduardoisia plans to work on further explore warning signs and triggers to work ahead in relation to this, and utilize PRN medication as appropriate  Eduardoisia plans to increase self-compassion towards herself  Rogelio and this writer discussed and processed CBT cognitive distortions, and thinking traps  Eduardoisia plans to further explore CBT thinking traps identifying: Over generalizations, All or Nothing Thinking, Must and Should Statements, Catastrophizing, Personalization, Emotional Reasoning, Compare and Despair, and Disqualifying  This writer e-mailed Rogelio a worksheet on this to further explore and identify her own personal thinking traps  Eduardoisia plans to continue to work on tracking and monitoring gratitude  Eduardoisia plans to increase the use of positive self-talk, and work on challenging ongoing cognitive distortions in the moment, deciphering between facts verses feelings  Eduardoisia plans to work on actively being present in the moment, increasing deep breathing techniques, and practicing mindfulness and meditation techniques  [place] plans to implement ongoing limits and boundaries   Eduardoisia plans to practice radical acceptance, identifying what is in her control verses what is not in her control  Buitrago Kwan plans to identify specific projects to work on around the house, structuring her schedule, and increasing balance  Rogelio plans to self-manage ongoing symptoms with grounding techniques, distress tolerance skills, distraction techniques, and coping skills  Rogelio plans to continue to assert and advocate for herself  Rogelio plans to continue to explore unmet needs, ask for what she needs, utilize effective forms of communication, and express herself  Rogelio plans to utilize ongoing opposite action skills  Rogelio plans to outreach for additional support as needed  I spent 50 minutes directly with the patient during this visit  VIRTUAL VISIT DISCLAIMER    Alaina Vleez acknowledges that she has consented to an online visit or consultation  She understands that the online visit is based solely on information provided by her, and that, in the absence of a face-to-face physical evaluation by the physician, the diagnosis she receives is both limited and provisional in terms of accuracy and completeness  This is not intended to replace a full medical face-to-face evaluation by the physician  Alaina Velez understands and accepts these terms

## 2020-09-30 ENCOUNTER — TELEMEDICINE (OUTPATIENT)
Dept: BEHAVIORAL/MENTAL HEALTH CLINIC | Facility: CLINIC | Age: 35
End: 2020-09-30
Payer: COMMERCIAL

## 2020-09-30 DIAGNOSIS — F43.23 ADJUSTMENT DISORDER WITH MIXED ANXIETY AND DEPRESSED MOOD: Primary | ICD-10-CM

## 2020-09-30 PROCEDURE — 90834 PSYTX W PT 45 MINUTES: CPT | Performed by: SOCIAL WORKER

## 2020-09-30 NOTE — PSYCH
Virtual Regular Visit    Assessment/Plan:    Problem List Items Addressed This Visit        Other    Adjustment disorder with mixed anxiety and depressed mood - Primary        Reason for visit is   Chief Complaint   Patient presents with    Virtual Regular Visit      Encounter provider Klarissa Beard    Provider located at [address] [id number] [phone]    Recent Visits  No visits were found meeting these conditions  Showing recent visits within past 7 days and meeting all other requirements     Today's Visits  Date Type Provider Dept   09/30/20 Telemedicine [facility]   Showing today's visits and meeting all other requirements     Future Appointments  No visits were found meeting these conditions  Showing future appointments within next 150 days and meeting all other requirements        The patient was identified by name and date of birth  Ally Stacy was informed that this is a telemedicine visit and that the visit is being conducted through [employer] and patient was informed that this is not a secure, HIPAA-complaint platform  She agrees to proceed     My office door was closed  No one else was in the room  She acknowledged consent and understanding of privacy and security of the video platform  The patient has agreed to participate and understands they can discontinue the visit at any time  Patient is aware this is a billable service  Subjective  Ally Stacy is a 30 y o  female        HPI     Past Medical History:   Diagnosis Date    Cervical dysplasia, mild     Current mild episode of major depressive disorder without prior episode ([facility] [address] ) 5/12/2020    Iron deficiency anemia        Past Surgical History:   Procedure Laterality Date    BREAST SURGERY Right     Lumpectomy     COLPOSCOPY         Current Outpatient Medications Medication Sig Dispense Refill    ALPRAZolam (XANAX) 0 25 mg tablet Take 1 tablet (0 25 mg total) by mouth 2 (two) times a day as needed for anxiety 15 tablet 0    escitalopram (LEXAPRO) 20 mg tablet Take 1 tablet (20 mg total) by mouth daily 30 tablet 2     No current facility-administered medications for this visit  No Known Allergies    Review of Systems    Video Exam    There were no vitals filed for this visit  Physical Exam     (D) Rogelio attended her follow up psychotherapy session today  Rogelio reports that since her last session, she has noticed an ongoing reduction in the intensity and frequency of her symptoms, cycles, and stressors  Rogelio describes primary symptoms of feeling fatigue, feeling tired, and having little energy, reporting that she struggles with motivation, resulting in her needing to use opposite action skills  Rogelio describes ongoing symptoms of feeling nervous, anxious, and on edge, and reporting sometimes not being able to stop or control worry  Rogelio reports having a significant reduction in depression symptoms  Rogelio reports that it has been (6) weeks since her last panic attack, reporting that they cycle every (5-6) weeks, and reports feeling relieved that she hasn't had one  Rogelio reports that basic ADL's can sometimes be difficult for her, reporting that showering, brushing her teeth, and changing her clothing takes effort  Rogelio discussed her history with fatigue  Rogelio reports that she has been tracking her sleep with an candie on her phone, utilizing opposite action skills, and structuring her schedule which she reports has been somewhat helpful for her  Rogelio and this writer processed this at length  Discussed ongoing skills  Reviewed limits and boundaries  Modeled effective forms of communication  Provided ongoing psychoeducation       (A) Rogelio describes symptoms nervousness, worrying, and anxiety, reporting that she sometimes feels on Kelli Kemp describes ongoing fatigue, reporting feeling tired, and having little energy, reporting a lack of motivation  Jory Hernandez reports a decrease in symptoms of sadness, and depression  Ally's mood presented as anxious and tired, and her affect appeared to be congruent  Jory Hernandez presented with good eye contact  Ally's speech presented at a normal rate, volume, and rhythm  Jory Hernandez presented as alert and oriented x3  Jory Hernandez denies any evident or immediate risk factors for self-harm, SI, or HI  Jory Hernandez discussed upcoming plans, and identified reasons to live  Jory Hernandez denies any symptoms of nehemiah, elevated/ hyperactive moods, impulsivity, or grandiose thinking  Jory Hernandez does not     (P) Jory Hernandez plans to explore sensory related grounding skills, specifically with smells to work on stimulating her mind  Jory Hernandez reports that she has a sleep consult on 10/13/20 that she plans to attend  Jory Hernandez reports that she plans to continue to track her sleep with the candie on her phone to gather data for this appointment  Jory Hernandez plans to follow up with her OBGYN 10/20/20  Jory Hernandez plans to continue to work on a healthy sleep hygiene routine  Jory Hernandez plans to continue to increase balance within her schedule, actively taking time for herself, increase the use of self-care, and practice ongoing opposite action skills  Jory Hernandez plans to continue to work on being active, getting outside, and leaning into her natural supports  Jory Hernandez plans to utilize grounding techniques, deep breathing techniques, mindfulness and meditation techniques, coping skills, distraction techniques, and distress tolerance skills to target symptoms, cycles, and stressors  Jory Hernandez plans to continue to ask for what she needs, utilize effective forms of communication, and express her feelings   Jory Hernandez plans to continue to challenge negative thinking, with reality testing, deciphering between facts verses feelings, increase self-compassion for herself, and increasing the use of positive self-talk  Rogelio plans to outreach for additional support as needed  I spent 45 minutes directly with the patient during this visit      VIRTUAL VISIT DISCLAIMER    Jackie Lake acknowledges that she has consented to an online visit or consultation  She understands that the online visit is based solely on information provided by her, and that, in the absence of a face-to-face physical evaluation by the physician, the diagnosis she receives is both limited and provisional in terms of accuracy and completeness  This is not intended to replace a full medical face-to-face evaluation by the physician  Jackie Lake understands and accepts these terms

## 2020-10-13 ENCOUNTER — OFFICE VISIT (OUTPATIENT)
Dept: SLEEP CENTER | Facility: HOSPITAL | Age: 35
End: 2020-10-13
Payer: COMMERCIAL

## 2020-10-13 VITALS
HEART RATE: 62 BPM | TEMPERATURE: 97.8 F | HEIGHT: 60 IN | WEIGHT: 133 LBS | BODY MASS INDEX: 26.11 KG/M2 | SYSTOLIC BLOOD PRESSURE: 100 MMHG | DIASTOLIC BLOOD PRESSURE: 60 MMHG

## 2020-10-13 DIAGNOSIS — G47.8 NON-RESTORATIVE SLEEP: ICD-10-CM

## 2020-10-13 DIAGNOSIS — F32.0 CURRENT MILD EPISODE OF MAJOR DEPRESSIVE DISORDER WITHOUT PRIOR EPISODE (HCC): ICD-10-CM

## 2020-10-13 DIAGNOSIS — F41.1 GAD (GENERALIZED ANXIETY DISORDER): ICD-10-CM

## 2020-10-13 DIAGNOSIS — F51.12 INSUFFICIENT SLEEP SYNDROME: ICD-10-CM

## 2020-10-13 DIAGNOSIS — E66.3 OVERWEIGHT WITH BODY MASS INDEX (BMI) OF 25 TO 25.9 IN ADULT: ICD-10-CM

## 2020-10-13 DIAGNOSIS — R40.0 DAYTIME SLEEPINESS: Primary | ICD-10-CM

## 2020-10-13 PROCEDURE — 99244 OFF/OP CNSLTJ NEW/EST MOD 40: CPT | Performed by: INTERNAL MEDICINE

## 2020-10-14 ENCOUNTER — TELEPHONE (OUTPATIENT)
Dept: BEHAVIORAL/MENTAL HEALTH CLINIC | Facility: CLINIC | Age: 35
End: 2020-10-14

## 2020-10-14 ENCOUNTER — TELEMEDICINE (OUTPATIENT)
Dept: BEHAVIORAL/MENTAL HEALTH CLINIC | Facility: CLINIC | Age: 35
End: 2020-10-14
Payer: COMMERCIAL

## 2020-10-14 DIAGNOSIS — F43.23 ADJUSTMENT DISORDER WITH MIXED ANXIETY AND DEPRESSED MOOD: Primary | ICD-10-CM

## 2020-10-14 PROCEDURE — 90834 PSYTX W PT 45 MINUTES: CPT | Performed by: SOCIAL WORKER

## 2020-10-15 ENCOUNTER — OFFICE VISIT (OUTPATIENT)
Dept: FAMILY MEDICINE CLINIC | Facility: HOSPITAL | Age: 35
End: 2020-10-15
Payer: COMMERCIAL

## 2020-10-15 VITALS
DIASTOLIC BLOOD PRESSURE: 68 MMHG | OXYGEN SATURATION: 97 % | BODY MASS INDEX: 25.8 KG/M2 | HEIGHT: 60 IN | HEART RATE: 60 BPM | TEMPERATURE: 97.4 F | WEIGHT: 131.4 LBS | SYSTOLIC BLOOD PRESSURE: 106 MMHG

## 2020-10-15 DIAGNOSIS — F32.0 CURRENT MILD EPISODE OF MAJOR DEPRESSIVE DISORDER WITHOUT PRIOR EPISODE (HCC): ICD-10-CM

## 2020-10-15 DIAGNOSIS — Z23 ENCOUNTER FOR IMMUNIZATION: ICD-10-CM

## 2020-10-15 DIAGNOSIS — R53.82 CHRONIC FATIGUE: ICD-10-CM

## 2020-10-15 DIAGNOSIS — F41.1 GAD (GENERALIZED ANXIETY DISORDER): Primary | ICD-10-CM

## 2020-10-15 PROCEDURE — 99214 OFFICE O/P EST MOD 30 MIN: CPT | Performed by: NURSE PRACTITIONER

## 2020-10-15 PROCEDURE — 90715 TDAP VACCINE 7 YRS/> IM: CPT | Performed by: NURSE PRACTITIONER

## 2020-10-15 PROCEDURE — 1036F TOBACCO NON-USER: CPT | Performed by: NURSE PRACTITIONER

## 2020-10-15 PROCEDURE — 90471 IMMUNIZATION ADMIN: CPT | Performed by: NURSE PRACTITIONER

## 2020-10-15 RX ORDER — BUPROPION HYDROCHLORIDE 150 MG/1
150 TABLET ORAL EVERY MORNING
Qty: 30 TABLET | Refills: 2 | Status: SHIPPED | OUTPATIENT
Start: 2020-10-15 | End: 2020-12-08 | Stop reason: ALTCHOICE

## 2020-11-04 ENCOUNTER — TELEPHONE (OUTPATIENT)
Dept: BEHAVIORAL/MENTAL HEALTH CLINIC | Facility: CLINIC | Age: 35
End: 2020-11-04

## 2020-12-02 ENCOUNTER — TELEPHONE (OUTPATIENT)
Dept: BEHAVIORAL/MENTAL HEALTH CLINIC | Facility: CLINIC | Age: 35
End: 2020-12-02

## 2020-12-08 ENCOUNTER — OFFICE VISIT (OUTPATIENT)
Dept: FAMILY MEDICINE CLINIC | Facility: HOSPITAL | Age: 35
End: 2020-12-08
Payer: COMMERCIAL

## 2020-12-08 VITALS
HEIGHT: 60 IN | DIASTOLIC BLOOD PRESSURE: 80 MMHG | BODY MASS INDEX: 27.8 KG/M2 | WEIGHT: 141.6 LBS | OXYGEN SATURATION: 98 % | TEMPERATURE: 97.6 F | HEART RATE: 61 BPM | SYSTOLIC BLOOD PRESSURE: 102 MMHG

## 2020-12-08 DIAGNOSIS — F32.0 CURRENT MILD EPISODE OF MAJOR DEPRESSIVE DISORDER WITHOUT PRIOR EPISODE (HCC): Primary | ICD-10-CM

## 2020-12-08 DIAGNOSIS — F41.1 GAD (GENERALIZED ANXIETY DISORDER): ICD-10-CM

## 2020-12-08 PROCEDURE — 99213 OFFICE O/P EST LOW 20 MIN: CPT | Performed by: NURSE PRACTITIONER

## 2020-12-08 PROCEDURE — 3725F SCREEN DEPRESSION PERFORMED: CPT | Performed by: NURSE PRACTITIONER

## 2020-12-08 PROCEDURE — 1036F TOBACCO NON-USER: CPT | Performed by: NURSE PRACTITIONER

## 2020-12-08 PROCEDURE — 3008F BODY MASS INDEX DOCD: CPT | Performed by: NURSE PRACTITIONER

## 2020-12-08 RX ORDER — ESCITALOPRAM OXALATE 20 MG/1
20 TABLET ORAL DAILY
Qty: 30 TABLET | Refills: 4 | Status: SHIPPED | OUTPATIENT
Start: 2020-12-08 | End: 2021-07-07

## 2020-12-30 ENCOUNTER — TELEPHONE (OUTPATIENT)
Dept: BEHAVIORAL/MENTAL HEALTH CLINIC | Facility: CLINIC | Age: 35
End: 2020-12-30

## 2021-01-06 ENCOUNTER — TELEPHONE (OUTPATIENT)
Dept: FAMILY MEDICINE CLINIC | Facility: HOSPITAL | Age: 36
End: 2021-01-06

## 2021-01-08 ENCOUNTER — TELEMEDICINE (OUTPATIENT)
Dept: BEHAVIORAL/MENTAL HEALTH CLINIC | Facility: CLINIC | Age: 36
End: 2021-01-08
Payer: COMMERCIAL

## 2021-01-08 DIAGNOSIS — F32.0 CURRENT MILD EPISODE OF MAJOR DEPRESSIVE DISORDER WITHOUT PRIOR EPISODE (HCC): Primary | ICD-10-CM

## 2021-01-08 DIAGNOSIS — F41.1 GAD (GENERALIZED ANXIETY DISORDER): ICD-10-CM

## 2021-01-08 PROCEDURE — 90834 PSYTX W PT 45 MINUTES: CPT | Performed by: SOCIAL WORKER
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
## 2021-01-26 ENCOUNTER — TELEMEDICINE (OUTPATIENT)
Dept: FAMILY MEDICINE CLINIC | Facility: HOSPITAL | Age: 36
End: 2021-01-26
Payer: COMMERCIAL

## 2021-01-26 VITALS — BODY MASS INDEX: 27.68 KG/M2 | WEIGHT: 141 LBS | HEIGHT: 60 IN

## 2021-01-26 DIAGNOSIS — F32.0 CURRENT MILD EPISODE OF MAJOR DEPRESSIVE DISORDER WITHOUT PRIOR EPISODE (HCC): Primary | ICD-10-CM

## 2021-01-26 DIAGNOSIS — F41.1 GAD (GENERALIZED ANXIETY DISORDER): ICD-10-CM

## 2021-01-26 PROCEDURE — 3725F SCREEN DEPRESSION PERFORMED: CPT | Performed by: NURSE PRACTITIONER

## 2021-01-26 PROCEDURE — 1036F TOBACCO NON-USER: CPT | Performed by: NURSE PRACTITIONER

## 2021-01-26 PROCEDURE — 99213 OFFICE O/P EST LOW 20 MIN: CPT | Performed by: NURSE PRACTITIONER

## 2021-01-26 PROCEDURE — 3008F BODY MASS INDEX DOCD: CPT | Performed by: NURSE PRACTITIONER

## 2021-01-26 NOTE — PROGRESS NOTES
Virtual Regular Visit      Assessment/Plan:    Problem List Items Addressed This Visit     None               Reason for visit is   Chief Complaint   Patient presents with    Follow-up        Encounter provider FLEX Tatum    Provider located at [address] [place] [address] MD  [address] [place] [address]      Recent Visits  No visits were found meeting these conditions  Showing recent visits within past 7 days and meeting all other requirements     Today's Visits  Date Type Provider Dept   01/26/21 4741 FLEX Knight  Yaima Malcolm Md   Showing today's visits and meeting all other requirements     Future Appointments  No visits were found meeting these conditions  Showing future appointments within next 150 days and meeting all other requirements        The patient was identified by name and date of birth  Teresa Larsen was informed that this is a telemedicine visit and that the visit is being conducted through [facility] and patient was informed that this is a secure, HIPAA-compliant platform  She agrees to proceed     My office door was closed  No one else was in the room  She acknowledged consent and understanding of privacy and security of the video platform  The patient has agreed to participate and understands they can discontinue the visit at any time  Patient is aware this is a billable service  Subjective  Teresa Larsen is a 30 y o  female    Started Wellbutrin 4 weeks ago  She started it because she was having panic attacks and using Xanax  Thought it would help with the panic attacks  Does not feel like she needs it and wants to stop  Besides a week of increased anxiety her mood has been good  Still in therapy  Has been exercising  Staying on a routine          Past Medical History:   Diagnosis Date    Anxiety     Cervical dysplasia, mild     Current mild episode of major depressive disorder without prior episode ([facility] [address] ) 5/12/2020    Iron deficiency anemia        Past Surgical History:   Procedure Laterality Date    BREAST SURGERY Right     Lumpectomy     COLPOSCOPY         Current Outpatient Medications   Medication Sig Dispense Refill    ALPRAZolam (XANAX) 0 25 mg tablet Take 1 tablet (0 25 mg total) by mouth 2 (two) times a day as needed for anxiety 15 tablet 0    escitalopram (LEXAPRO) 20 mg tablet Take 1 tablet (20 mg total) by mouth daily 30 tablet 4     No current facility-administered medications for this visit  No Known Allergies    Review of Systems   Constitutional: Negative for activity change, appetite change, fatigue and unexpected weight change  Psychiatric/Behavioral: Negative for agitation, dysphoric mood, sleep disturbance and suicidal ideas  The patient is nervous/anxious  Video Exam    Vitals:    01/26/21 1247   Weight: 64 kg (141 lb)   Height: 5' (1 524 m)       Physical Exam  Vitals signs reviewed  Constitutional:       Appearance: Normal appearance  Pulmonary:      Effort: Pulmonary effort is normal    Neurological:      Mental Status: She is alert and oriented to person, place, and time  Psychiatric:         Mood and Affect: Mood normal          Behavior: Behavior normal          Thought Content: Thought content normal          Judgment: Judgment normal           I spent 15 minutes directly with the patient during this visit      Gita acknowledges that she has consented to an online visit or consultation  She understands that the online visit is based solely on information provided by her, and that, in the absence of a face-to-face physical evaluation by the physician, the diagnosis she receives is both limited and provisional in terms of accuracy and completeness  This is not intended to replace a full medical face-to-face evaluation by the physician  Ashley Guerra understands and accepts these terms

## 2021-01-26 NOTE — ASSESSMENT & PLAN NOTE
Temporary increase in anxiety with panic attacks  Started Wellbutrin which she feels is not beneficial    OK to stop  Take every other day for 1 week then stop  F/U in 4 months  Call with any increase in anxiety/panic attacks

## 2021-01-28 ENCOUNTER — TELEMEDICINE (OUTPATIENT)
Dept: BEHAVIORAL/MENTAL HEALTH CLINIC | Facility: CLINIC | Age: 36
End: 2021-01-28
Payer: COMMERCIAL

## 2021-01-28 DIAGNOSIS — F43.23 ADJUSTMENT DISORDER WITH MIXED ANXIETY AND DEPRESSED MOOD: Primary | ICD-10-CM

## 2021-01-28 PROCEDURE — 90834 PSYTX W PT 45 MINUTES: CPT | Performed by: SOCIAL WORKER

## 2021-01-28 NOTE — PSYCH
Virtual Regular Visit    Assessment/Plan:    Problem List Items Addressed This Visit        Other    Adjustment disorder with mixed anxiety and depressed mood - Primary        Reason for visit is   Chief Complaint   Patient presents with    Virtual Regular Visit      Encounter provider Linda Negron    Provider located at [address] [facility] [address] [phone]    Recent Visits  Date Type Provider Dept   01/26/21 4401 [facility], CRNP Pg Myesha Herman Md   Showing recent visits within past 7 days and meeting all other requirements     Today's Visits  Date Type Provider Dept   01/28/21 Telemedicine Tricia Shell [facility] F[facility]   Showing today's visits and meeting all other requirements     Future Appointments  No visits were found meeting these conditions  Showing future appointments within next 150 days and meeting all other requirements      The patient was identified by name and date of birth  Joaquin Garcia was informed that this is a telemedicine visit and that the visit is being conducted through [employer] and patient was informed that this is not a secure, HIPAA-compliant platform  She agrees to proceed  My office door was closed  No one else was in the room  She acknowledged consent and understanding of privacy and security of the video platform  The patient has agreed to participate and understands they can discontinue the visit at any time  *This note was not shared with the patient due to this being a psychotherapy note  *    Patient is aware this is a billable service  Subjective  Joaquin Garcia is a 30 y o  female      HPI     Past Medical History:   Diagnosis Date    Anxiety     Cervical dysplasia, mild     Current mild episode of major depressive disorder without prior episode ([facility] [address] ) 5/12/2020    Iron deficiency anemia        Past Surgical History:   Procedure Laterality Date    BREAST SURGERY Right     Lumpectomy     COLPOSCOPY         Current Outpatient Medications   Medication Sig Dispense Refill    ALPRAZolam (XANAX) 0 25 mg tablet Take 1 tablet (0 25 mg total) by mouth 2 (two) times a day as needed for anxiety 15 tablet 0    escitalopram (LEXAPRO) 20 mg tablet Take 1 tablet (20 mg total) by mouth daily 30 tablet 4     No current facility-administered medications for this visit  No Known Allergies    Review of Systems    Video Exam    There were no vitals filed for this visit  Physical Exam     (D) Rogelio attended her follow up psychotherapy session today  Rogelio reports that since her last session, she has noticed a decrease in the intensity and frequency of her symptoms of anxiety and depression  Rogelio reports that she worked with her PCP and went of the wellbutrin, reporting that she didn't think that it was helpful for her  Rogelio reports that she was able to implement stuff that we discussed in therapy, including having a schedule with routine, and consistency  Rogelio reports that she was also able to increase physical activity, going on a walk with her mother daily for (1) hour, and also participating in at home exercises  Rogelio reports that she has been able to spend time with her mother, and discussed their history of inconsistency in their relationship  Rogelio reports that her relationship with her mother is improving; however, reports that she doesn't typically share her feelings and her emotions with her mother  Rogelio spent time discussing and processing her upbringing  Rogelio reports 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, and reports that she has been since March, 2020 when the pandemic hit   Rogelio reports that she has a desire to go back to work, yet doesn't feel like she can at this time, reporting that 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, 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  Rogelio reports feeling like this is the best choice and decision for herself and her family  Rogelio reports that she experiences some financial stressors with being out of work, yet reports that they are working through this  Rogelio reports that she had one episode with panic since her last session, and reports that she took her PRN of xanax and utilized skills, and was able to work through this  [place] and this writer processed this at length  Provided ongoing psychoeducation  Discussed ongoing skills  Reviewed limits and boundaries  Modeled effective forms of communication  (A) Rogelio reports a decrease in the intensity and frequency of her symptoms since her last session  Rogelio reports some symptoms of feeling tired and having little energy, report reports an improvement in this  Rogelio reports since adding the B12 vitamin, multi-vitamin, and magnesium, she has noticed a decrease in fatigue  Rogelio describes symptoms of poor frustration tolerance, and irritability, and reports worrying too much about different things  Guilles mood presented as slightly anxious and her affect appeared to be congruent  Rogelio does not appear to be endorsing criteria for nehemiah at this time  Rogelio denies symptoms of impulsivity, nehemiah, grandiose thinking, or hyperactive/ elevated moods  Denies any evident or immediate risk factors for self-harm, SI, or HI  Rogelio presented as forward thinking, discussed upcoming plans, and identified reasons to live  Presented with good eye contact  Speech presented at a normal rate, volume, and rhythm  Presented as alert and oriented x3  (P) Rogelio plans to continue to take hour long walks with her mother, and have adult time with natural supports   Rogelio plans to continue to work on strengthening interpersonal relationship stressors by effectively expressing her needs, and utilizing effective forms of communication  [place] plans to make space for her feelings and emotions  [place] plans to work on validating her feelings, and challenging negative thinking traps, increasing reality testing, and positive self-talk, while increasing compassion towards herself  [place] plans to continue to prioritize her mental health needs  [place] plans to actively work on being present in the moment, increasing deep breathing techniques, and practicing mindfulness and meditation techniques  [place] plans to continue to exercise and increase physical activity  [place] plans to continue to stick with consistency in a routine with having balance in her life  [place] plans to practice ongoing radical acceptance  [place] plans to utilize opposite action skills, and continue with a healthy sleep hygiene routine  Eduard[place]ia plans to outreach for additional support as needed  I spent 45 minutes directly with the patient during this visit  VIRTUAL VISIT DISCLAIMER    Sujit Power acknowledges that she has consented to an online visit or consultation  She understands that the online visit is based solely on information provided by her, and that, in the absence of a face-to-face physical evaluation by the physician, the diagnosis she receives is both limited and provisional in terms of accuracy and completeness  This is not intended to replace a full medical face-to-face evaluation by the physician  Sujit Power understands and accepts these terms

## 2021-02-25 ENCOUNTER — TELEPHONE (OUTPATIENT)
Dept: BEHAVIORAL/MENTAL HEALTH CLINIC | Facility: CLINIC | Age: 36
End: 2021-02-25

## 2021-03-18 ENCOUNTER — TELEPHONE (OUTPATIENT)
Dept: BEHAVIORAL/MENTAL HEALTH CLINIC | Facility: CLINIC | Age: 36
End: 2021-03-18

## 2021-03-18 NOTE — TELEPHONE ENCOUNTER
Outreached to [facility] for her follow up psychotherapy session via telehealth; however, she did not answer

## 2021-04-15 ENCOUNTER — TELEPHONE (OUTPATIENT)
Dept: BEHAVIORAL/MENTAL HEALTH CLINIC | Facility: CLINIC | Age: 36
End: 2021-04-15

## 2021-04-15 NOTE — TELEPHONE ENCOUNTER
Outreached to [facility] via [facility] for her session; however, she did not answer

## 2021-04-17 ENCOUNTER — NURSE TRIAGE (OUTPATIENT)
Dept: OTHER | Facility: OTHER | Age: 36
End: 2021-04-17

## 2021-04-17 NOTE — TELEPHONE ENCOUNTER
Regarding: COVID Question  ----- Message from Barbara Ayala sent at 4/17/2021 11:05 AM EDT -----  " Should I quarantine although my fathers results came back negative? Should he still quarantine?

## 2021-08-03 ENCOUNTER — OFFICE VISIT (OUTPATIENT)
Dept: FAMILY MEDICINE CLINIC | Facility: HOSPITAL | Age: 36
End: 2021-08-03
Payer: COMMERCIAL

## 2021-08-03 VITALS
DIASTOLIC BLOOD PRESSURE: 62 MMHG | HEIGHT: 60 IN | HEART RATE: 60 BPM | BODY MASS INDEX: 28.43 KG/M2 | WEIGHT: 144.8 LBS | TEMPERATURE: 97.6 F | SYSTOLIC BLOOD PRESSURE: 110 MMHG | OXYGEN SATURATION: 98 %

## 2021-08-03 DIAGNOSIS — F41.1 GAD (GENERALIZED ANXIETY DISORDER): Primary | ICD-10-CM

## 2021-08-03 DIAGNOSIS — F32.0 CURRENT MILD EPISODE OF MAJOR DEPRESSIVE DISORDER WITHOUT PRIOR EPISODE (HCC): ICD-10-CM

## 2021-08-03 PROCEDURE — 1036F TOBACCO NON-USER: CPT | Performed by: NURSE PRACTITIONER

## 2021-08-03 PROCEDURE — 3725F SCREEN DEPRESSION PERFORMED: CPT | Performed by: NURSE PRACTITIONER

## 2021-08-03 PROCEDURE — 99213 OFFICE O/P EST LOW 20 MIN: CPT | Performed by: NURSE PRACTITIONER

## 2021-08-03 RX ORDER — ESCITALOPRAM OXALATE 20 MG/1
20 TABLET ORAL DAILY
Qty: 90 TABLET | Refills: 1 | Status: SHIPPED | OUTPATIENT
Start: 2021-08-03 | End: 2022-02-01 | Stop reason: ALTCHOICE

## 2021-08-03 NOTE — ASSESSMENT & PLAN NOTE
PHQ-9=7  Doing well overall despite recent loss  Continue on current lexapro dose  Should consider return to therapy if grief is not improving  F/U in 6 months

## 2021-08-03 NOTE — ASSESSMENT & PLAN NOTE
SARAH-7=6  Doing well overall despite recent loss  Continue on current lexapro dose  Should consider return to therapy if grief is not improving  F/U in 6 months

## 2021-08-03 NOTE — PROGRESS NOTES
Assessment/Plan:    SARAH (generalized anxiety disorder)  SARAH-7=6  Doing well overall despite recent loss  Continue on current lexapro dose  Should consider return to therapy if grief is not improving  F/U in 6 months  Current mild episode of major depressive disorder without prior episode (HCC)  PHQ-9=7  Doing well overall despite recent loss  Continue on current lexapro dose  Should consider return to therapy if grief is not improving  F/U in 6 months  Diagnoses and all orders for this visit:    SARAH (generalized anxiety disorder)  -     escitalopram (LEXAPRO) 20 mg tablet; Take 1 tablet (20 mg total) by mouth daily    Current mild episode of major depressive disorder without prior episode (HCC)  -     escitalopram (LEXAPRO) 20 mg tablet; Take 1 tablet (20 mg total) by mouth daily          Subjective:      Patient ID: Rocio Dumont is a 29 y o  female  The last month has been hard  Cousin   No panic attack since January  Over the last month has felt more panicky and sleeping more  Has mastered how to talk herself out of the anxiety  No longer in therapy  Tolerating Lexapro w/o AE  Has gained weight  Not overly bothersome  The following portions of the patient's history were reviewed and updated as appropriate: allergies, current medications, past family history, past medical history, past social history, past surgical history and problem list     Review of Systems   Constitutional: Negative for activity change, appetite change, fatigue and unexpected weight change  Psychiatric/Behavioral: Negative for decreased concentration, dysphoric mood, sleep disturbance and suicidal ideas  The patient is nervous/anxious  Objective:  Vitals:    21 1309   BP: 110/62   Pulse: 60   Temp: 97 6 °F (36 4 °C)   SpO2: 98%      Physical Exam  Vitals reviewed  Constitutional:       Appearance: Normal appearance     Cardiovascular:      Rate and Rhythm: Normal rate and regular rhythm  Heart sounds: Normal heart sounds  Pulmonary:      Effort: Pulmonary effort is normal       Breath sounds: Normal breath sounds  Neurological:      Mental Status: She is alert and oriented to person, place, and time  Psychiatric:         Mood and Affect: Mood normal          Behavior: Behavior normal          Thought Content: Thought content normal          Judgment: Judgment normal          BMI Counseling: Body mass index is 28 28 kg/m²  The BMI is above normal  Nutrition recommendations include reducing portion sizes, decreasing overall calorie intake, 3-5 servings of fruits/vegetables daily, reducing fast food intake, consuming healthier snacks, decreasing soda and/or juice intake, moderation in carbohydrate intake and increasing intake of lean protein  Exercise recommendations include moderate aerobic physical activity for 150 minutes/week

## 2021-08-03 NOTE — PATIENT INSTRUCTIONS
Weight Management   AMBULATORY CARE:   Why it is important to manage your weight:  Being overweight increases your risk of health conditions such as heart disease, high blood pressure, type 2 diabetes, and certain types of cancer  It can also increase your risk for osteoarthritis, sleep apnea, and other respiratory problems  Aim for a slow, steady weight loss  Even a small amount of weight loss can lower your risk of health problems  How to lose weight safely:  A safe and healthy way to lose weight is to eat fewer calories and get regular exercise  · You can lose up about 1 pound a week by decreasing the number of calories you eat by 500 calories each day  You can decrease calories by eating smaller portion sizes or by cutting out high-calorie foods  Read labels to find out how many calories are in the foods you eat  · You can also burn calories with exercise such as walking, swimming, or biking  You will be more likely to keep weight off if you make these changes part of your lifestyle  Exercise at least 30 minutes per day on most days of the week  You can also fit in more physical activity by taking the stairs instead of the elevator or parking farther away from stores  Ask your healthcare provider about the best exercise plan for you  Healthy meal plan for weight management:  A healthy meal plan includes a variety of foods, contains fewer calories, and helps you stay healthy  A healthy meal plan includes the following:     · Eat whole-grain foods more often  A healthy meal plan should contain fiber  Fiber is the part of grains, fruits, and vegetables that is not broken down by your body  Whole-grain foods are healthy and provide extra fiber in your diet  Some examples of whole-grain foods are whole-wheat breads and pastas, oatmeal, brown rice, and bulgur  · Eat a variety of vegetables every day  Include dark, leafy greens such as spinach, kale, andrew greens, and mustard greens   Eat yellow and orange vegetables such as carrots, sweet potatoes, and winter squash  · Eat a variety of fruits every day  Choose fresh or canned fruit (canned in its own juice or light syrup) instead of juice  Fruit juice has very little or no fiber  · Eat low-fat dairy foods  Drink fat-free (skim) milk or 1% milk  Eat fat-free yogurt and low-fat cottage cheese  Try low-fat cheeses such as mozzarella and other reduced-fat cheeses  · Choose meat and other protein foods that are low in fat  Choose beans or other legumes such as split peas or lentils  Choose fish, skinless poultry (chicken or turkey), or lean cuts of red meat (beef or pork)  Before you cook meat or poultry, cut off any visible fat  · Use less fat and oil  Try baking foods instead of frying them  Add less fat, such as margarine, sour cream, regular salad dressing and mayonnaise to foods  Eat fewer high-fat foods  Some examples of high-fat foods include french fries, doughnuts, ice cream, and cakes  · Eat fewer sweets  Limit foods and drinks that are high in sugar  This includes candy, cookies, regular soda, and sweetened drinks  Ways to decrease calories:   · Eat smaller portions  ? Use a small plate with smaller servings  ? Do not eat second helpings  ? When you eat at a restaurant, ask for a box and place half of your meal in the box before you eat  ? Share an entrée with someone else  · Replace high-calorie snacks with healthy, low-calorie snacks  ? Choose fresh fruit, vegetables, fat-free rice cakes, or air-popped popcorn instead of potato chips, nuts, or chocolate  ? Choose water or calorie-free drinks instead of soda or sweetened drinks  · Do not shop for groceries when you are hungry  You may be more likely to make unhealthy food choices  Take a grocery list of healthy foods and shop after you have eaten  · Eat regular meals  Do not skip meals  Skipping meals can lead to overeating later in the day   This can make it harder for you to lose weight  Eat a healthy snack in place of a meal if you do not have time to eat a regular meal  Talk with a dietitian to help you create a meal plan and schedule that is right for you  Other things to consider as you try to lose weight:   · Be aware of situations that may give you the urge to overeat, such as eating while watching television  Find ways to avoid these situations  For example, read a book, go for a walk, or do crafts  · Meet with a weight loss support group or friends who are also trying to lose weight  This may help you stay motivated to continue working on your weight loss goals  © Copyright [employer] 2021 Information is for End User's use only and may not be sold, redistributed or otherwise used for commercial purposes  All illustrations and images included in CareNotes® are the copyrighted property of A D A M [employer] Inc  or [employer]   The above information is an  only  It is not intended as medical advice for individual conditions or treatments  Talk to your doctor, nurse or pharmacist before following any medical regimen to see if it is safe and effective for you

## 2021-09-27 ENCOUNTER — TELEPHONE (OUTPATIENT)
Dept: BEHAVIORAL/MENTAL HEALTH CLINIC | Facility: CLINIC | Age: 36
End: 2021-09-27

## 2021-09-27 NOTE — TELEPHONE ENCOUNTER
Received e-mail from [facility] today, requesting follow up appointment  Was schedule for tomorrow Tuesday 09/28/21 @ 11:00am for telehealth visit via [employer]

## 2021-09-28 ENCOUNTER — TELEMEDICINE (OUTPATIENT)
Dept: BEHAVIORAL/MENTAL HEALTH CLINIC | Facility: CLINIC | Age: 36
End: 2021-09-28
Payer: COMMERCIAL

## 2021-09-28 DIAGNOSIS — F43.21 GRIEF REACTION: ICD-10-CM

## 2021-09-28 DIAGNOSIS — F32.0 CURRENT MILD EPISODE OF MAJOR DEPRESSIVE DISORDER WITHOUT PRIOR EPISODE (HCC): ICD-10-CM

## 2021-09-28 DIAGNOSIS — F33.0 MILD RECURRENT MAJOR DEPRESSION (HCC): Primary | ICD-10-CM

## 2021-09-28 DIAGNOSIS — F41.1 GAD (GENERALIZED ANXIETY DISORDER): ICD-10-CM

## 2021-09-28 PROBLEM — F43.20 GRIEF REACTION: Status: ACTIVE | Noted: 2021-09-28

## 2021-09-28 PROBLEM — F43.23 ADJUSTMENT DISORDER WITH MIXED ANXIETY AND DEPRESSED MOOD: Status: RESOLVED | Noted: 2020-08-04 | Resolved: 2021-09-28

## 2021-09-28 PROCEDURE — 90834 PSYTX W PT 45 MINUTES: CPT | Performed by: SOCIAL WORKER
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
## 2021-10-05 ENCOUNTER — TELEMEDICINE (OUTPATIENT)
Dept: BEHAVIORAL/MENTAL HEALTH CLINIC | Facility: CLINIC | Age: 36
End: 2021-10-05
Payer: COMMERCIAL

## 2021-10-05 DIAGNOSIS — F41.1 GAD (GENERALIZED ANXIETY DISORDER): Primary | ICD-10-CM

## 2021-10-05 DIAGNOSIS — F43.21 GRIEF REACTION: ICD-10-CM

## 2021-10-05 PROCEDURE — 90834 PSYTX W PT 45 MINUTES: CPT | Performed by: SOCIAL WORKER

## 2021-10-26 ENCOUNTER — TELEMEDICINE (OUTPATIENT)
Dept: BEHAVIORAL/MENTAL HEALTH CLINIC | Facility: CLINIC | Age: 36
End: 2021-10-26
Payer: COMMERCIAL

## 2021-10-26 DIAGNOSIS — F43.21 GRIEF REACTION: ICD-10-CM

## 2021-10-26 DIAGNOSIS — F41.1 GAD (GENERALIZED ANXIETY DISORDER): Primary | ICD-10-CM

## 2021-10-26 PROCEDURE — 90834 PSYTX W PT 45 MINUTES: CPT | Performed by: SOCIAL WORKER

## 2021-11-09 ENCOUNTER — TELEPHONE (OUTPATIENT)
Dept: BEHAVIORAL/MENTAL HEALTH CLINIC | Facility: CLINIC | Age: 36
End: 2021-11-09

## 2021-11-09 ENCOUNTER — TELEMEDICINE (OUTPATIENT)
Dept: BEHAVIORAL/MENTAL HEALTH CLINIC | Facility: CLINIC | Age: 36
End: 2021-11-09
Payer: COMMERCIAL

## 2021-11-09 DIAGNOSIS — F43.21 GRIEF REACTION: ICD-10-CM

## 2021-11-09 DIAGNOSIS — F41.1 GAD (GENERALIZED ANXIETY DISORDER): Primary | ICD-10-CM

## 2021-11-09 PROCEDURE — 90832 PSYTX W PT 30 MINUTES: CPT | Performed by: SOCIAL WORKER

## 2021-11-22 ENCOUNTER — TELEMEDICINE (OUTPATIENT)
Dept: BEHAVIORAL/MENTAL HEALTH CLINIC | Facility: CLINIC | Age: 36
End: 2021-11-22
Payer: COMMERCIAL

## 2021-11-22 DIAGNOSIS — F41.1 GAD (GENERALIZED ANXIETY DISORDER): Primary | ICD-10-CM

## 2021-11-22 DIAGNOSIS — F43.21 GRIEF REACTION: ICD-10-CM

## 2021-11-22 PROCEDURE — 90834 PSYTX W PT 45 MINUTES: CPT | Performed by: SOCIAL WORKER

## 2021-12-14 ENCOUNTER — TELEPHONE (OUTPATIENT)
Dept: BEHAVIORAL/MENTAL HEALTH CLINIC | Facility: CLINIC | Age: 36
End: 2021-12-14

## 2021-12-17 ENCOUNTER — TELEMEDICINE (OUTPATIENT)
Dept: BEHAVIORAL/MENTAL HEALTH CLINIC | Facility: CLINIC | Age: 36
End: 2021-12-17
Payer: COMMERCIAL

## 2021-12-17 DIAGNOSIS — F43.21 GRIEF REACTION: ICD-10-CM

## 2021-12-17 DIAGNOSIS — F41.1 GAD (GENERALIZED ANXIETY DISORDER): Primary | ICD-10-CM

## 2021-12-17 PROCEDURE — 90834 PSYTX W PT 45 MINUTES: CPT | Performed by: SOCIAL WORKER

## 2021-12-29 ENCOUNTER — TELEPHONE (OUTPATIENT)
Dept: BEHAVIORAL/MENTAL HEALTH CLINIC | Facility: CLINIC | Age: 36
End: 2021-12-29

## 2022-01-06 ENCOUNTER — OFFICE VISIT (OUTPATIENT)
Dept: FAMILY MEDICINE CLINIC | Facility: HOSPITAL | Age: 37
End: 2022-01-06
Payer: COMMERCIAL

## 2022-01-06 VITALS
DIASTOLIC BLOOD PRESSURE: 70 MMHG | TEMPERATURE: 99.6 F | HEART RATE: 62 BPM | HEIGHT: 60 IN | SYSTOLIC BLOOD PRESSURE: 112 MMHG | WEIGHT: 133.6 LBS | BODY MASS INDEX: 26.23 KG/M2

## 2022-01-06 DIAGNOSIS — F41.1 GAD (GENERALIZED ANXIETY DISORDER): Primary | ICD-10-CM

## 2022-01-06 DIAGNOSIS — F33.1 MODERATE EPISODE OF RECURRENT MAJOR DEPRESSIVE DISORDER (HCC): ICD-10-CM

## 2022-01-06 PROCEDURE — 3008F BODY MASS INDEX DOCD: CPT | Performed by: NURSE PRACTITIONER

## 2022-01-06 PROCEDURE — 99214 OFFICE O/P EST MOD 30 MIN: CPT | Performed by: NURSE PRACTITIONER

## 2022-01-06 PROCEDURE — 1036F TOBACCO NON-USER: CPT | Performed by: NURSE PRACTITIONER

## 2022-01-06 PROCEDURE — 3725F SCREEN DEPRESSION PERFORMED: CPT | Performed by: NURSE PRACTITIONER

## 2022-01-06 RX ORDER — BUSPIRONE HYDROCHLORIDE 5 MG/1
5 TABLET ORAL 2 TIMES DAILY
Qty: 60 TABLET | Refills: 1 | Status: SHIPPED | OUTPATIENT
Start: 2022-01-06 | End: 2022-02-01 | Stop reason: SDUPTHER

## 2022-01-06 RX ORDER — ALPRAZOLAM 0.25 MG/1
0.25 TABLET ORAL 2 TIMES DAILY PRN
Qty: 15 TABLET | Refills: 0 | Status: SHIPPED | OUTPATIENT
Start: 2022-01-06 | End: 2022-04-14 | Stop reason: ALTCHOICE

## 2022-01-06 NOTE — PROGRESS NOTES
Assessment/Plan:    SARAH (generalized anxiety disorder)  SARAH-7=21  Discussed switching to venlafaxine vs adding in Buspar  Pt wants to add in Buspar  Will also refill Xanax to help with panic symptoms  F/U in 4 weeks  Call or message with worsening mood  Moderate episode of recurrent major depressive disorder (HCC)  PHQ-9=18  I feel depressive symptoms are stemming from uncontrolled anxiety  Will add in Buspar for anxiety  Discussed with pt consideration of switching to venlafaxine from lexapro  F/U in 4 weeks  Diagnoses and all orders for this visit:    SARAH (generalized anxiety disorder)  -     busPIRone (BUSPAR) 5 mg tablet; Take 1 tablet (5 mg total) by mouth 2 (two) times a day  -     ALPRAZolam (XANAX) 0 25 mg tablet; Take 1 tablet (0 25 mg total) by mouth 2 (two) times a day as needed for anxiety    Moderate episode of recurrent major depressive disorder (HCC)          Subjective:      Patient ID: Tunde [place] is a 29 y o  female  Increased anxiety over the last 1 1/2 weeks  Irritable, anxious, crying  Has felt pretty good until recently  No triggers  Not eating  On lexapro 20 mg  Also taking magnesium and Vit B12  Still seeing therapist  Has never tried another antidepressant except for wellbutrin  Did have Xanax and found this helpful at times  Not refilled since 2020  The following portions of the patient's history were reviewed and updated as appropriate: allergies, current medications, past family history, past medical history, past social history, past surgical history and problem list     Review of Systems   Constitutional: Positive for appetite change and fatigue  Psychiatric/Behavioral: Positive for agitation, dysphoric mood and sleep disturbance  Negative for suicidal ideas  The patient is nervous/anxious  Objective:  Vitals:    01/06/22 1342   BP: 112/70   Pulse: 62   Temp: 99 6 °F (37 6 °C)      Physical Exam  Vitals reviewed     Constitutional: Appearance: Normal appearance  Cardiovascular:      Rate and Rhythm: Normal rate and regular rhythm  Pulmonary:      Effort: Pulmonary effort is normal       Breath sounds: Normal breath sounds  Neurological:      Mental Status: She is alert and oriented to person, place, and time  Psychiatric:         Mood and Affect: Mood normal          Behavior: Behavior normal          Thought Content:  Thought content normal          Judgment: Judgment normal

## 2022-01-06 NOTE — ASSESSMENT & PLAN NOTE
PHQ-9=18  I feel depressive symptoms are stemming from uncontrolled anxiety  Will add in Buspar for anxiety  Discussed with pt consideration of switching to venlafaxine from lexapro  F/U in 4 weeks

## 2022-01-06 NOTE — ASSESSMENT & PLAN NOTE
SARAH-7=21  Discussed switching to venlafaxine vs adding in Buspar  Pt wants to add in Buspar  Will also refill Xanax to help with panic symptoms  F/U in 4 weeks  Call or message with worsening mood

## 2022-01-07 ENCOUNTER — TELEPHONE (OUTPATIENT)
Dept: FAMILY MEDICINE CLINIC | Facility: HOSPITAL | Age: 37
End: 2022-01-07

## 2022-01-07 NOTE — TELEPHONE ENCOUNTER
Patient did a home test today and came up covid+  The lines were faint so she feels she is on the tail end of it  She was exposed 14 days ago  Had a bad day last Wednesday but that was the only day she felt she had symptoms  She feels the covid is the cause of her anxiety and panic attacks  She had the same anxiety/panic attacks last year and is pretty sure she was covid+ at that time as well  She's almost relieved to know she is covid+  She is scheduled to see you 2/1 to f/u but knows to call if she needs to see you sooner

## 2022-01-09 NOTE — TELEPHONE ENCOUNTER
No further f/u is needed at this point  Sounds as though she is past point of isolation if symptoms were Wednesday

## 2022-01-17 ENCOUNTER — TELEPHONE (OUTPATIENT)
Dept: BEHAVIORAL/MENTAL HEALTH CLINIC | Facility: CLINIC | Age: 37
End: 2022-01-17

## 2022-01-25 ENCOUNTER — TELEPHONE (OUTPATIENT)
Dept: BEHAVIORAL/MENTAL HEALTH CLINIC | Facility: CLINIC | Age: 37
End: 2022-01-25

## 2022-01-25 ENCOUNTER — RA CDI HCC (OUTPATIENT)
Dept: OTHER | Facility: HOSPITAL | Age: 37
End: 2022-01-25

## 2022-01-25 NOTE — TELEPHONE ENCOUNTER
Left another message for [facility] regarding missed session, requested a call back in relation to this

## 2022-01-25 NOTE — PROGRESS NOTES
[facility]  coding opportunities          Number of diagnosis code(s) already on the problem list added to FYI fla  F33 1                     Patients insurance company: [employer] (Medicare Advantage and Commercial)

## 2022-01-25 NOTE — TELEPHONE ENCOUNTER
[address] [place] [address] for session; however, did not answer  Left a message requesting a call back in relation to this

## 2022-02-01 ENCOUNTER — OFFICE VISIT (OUTPATIENT)
Dept: FAMILY MEDICINE CLINIC | Facility: HOSPITAL | Age: 37
End: 2022-02-01
Payer: COMMERCIAL

## 2022-02-01 VITALS
DIASTOLIC BLOOD PRESSURE: 85 MMHG | OXYGEN SATURATION: 100 % | HEART RATE: 51 BPM | TEMPERATURE: 97.7 F | SYSTOLIC BLOOD PRESSURE: 120 MMHG | WEIGHT: 133.6 LBS | BODY MASS INDEX: 26.23 KG/M2 | HEIGHT: 60 IN

## 2022-02-01 DIAGNOSIS — F41.1 GAD (GENERALIZED ANXIETY DISORDER): ICD-10-CM

## 2022-02-01 DIAGNOSIS — F33.1 MODERATE EPISODE OF RECURRENT MAJOR DEPRESSIVE DISORDER (HCC): Primary | ICD-10-CM

## 2022-02-01 PROCEDURE — 99214 OFFICE O/P EST MOD 30 MIN: CPT | Performed by: NURSE PRACTITIONER

## 2022-02-01 PROCEDURE — 1036F TOBACCO NON-USER: CPT | Performed by: NURSE PRACTITIONER

## 2022-02-01 PROCEDURE — 3008F BODY MASS INDEX DOCD: CPT | Performed by: NURSE PRACTITIONER

## 2022-02-01 RX ORDER — VENLAFAXINE HYDROCHLORIDE 37.5 MG/1
37.5 CAPSULE, EXTENDED RELEASE ORAL
Qty: 30 CAPSULE | Refills: 1 | Status: SHIPPED | OUTPATIENT
Start: 2022-02-01 | End: 2022-03-01 | Stop reason: SDUPTHER

## 2022-02-01 RX ORDER — BUSPIRONE HYDROCHLORIDE 5 MG/1
5 TABLET ORAL 2 TIMES DAILY
Qty: 60 TABLET | Refills: 1 | Status: SHIPPED | OUTPATIENT
Start: 2022-02-01 | End: 2022-03-01 | Stop reason: SDUPTHER

## 2022-02-01 NOTE — ASSESSMENT & PLAN NOTE
Some improvement but does not feel Lexapro is doing what it needs to do  Agreeable to switching to venlafaxine  Can start tomorrow  Discussed importance of taking this med at the same time daily  Common AE discussed and advised NO abrupt cessation of med  Will continue on current Buspar dose  Call with any questions or worsening mood

## 2022-02-01 NOTE — PROGRESS NOTES
Assessment/Plan:    SARAH (generalized anxiety disorder)  Some improvement but does not feel Lexapro is doing what it needs to do  Agreeable to switching to venlafaxine  Can start tomorrow  Discussed importance of taking this med at the same time daily  Common AE discussed and advised NO abrupt cessation of med  Will continue on current Buspar dose  Call with any questions or worsening mood  Moderate episode of recurrent major depressive disorder (HCC)  Somewhat controlled  Agreeable to switch to venlafaxine from lexapro  F/U in 4 weeks  Diagnoses and all orders for this visit:    Moderate episode of recurrent major depressive disorder (HCC)  -     venlafaxine (EFFEXOR-XR) 37 5 mg 24 hr capsule; Take 1 capsule (37 5 mg total) by mouth daily with breakfast    SARAH (generalized anxiety disorder)  -     venlafaxine (EFFEXOR-XR) 37 5 mg 24 hr capsule; Take 1 capsule (37 5 mg total) by mouth daily with breakfast  -     busPIRone (BUSPAR) 5 mg tablet; Take 1 tablet (5 mg total) by mouth 2 (two) times a day          Subjective:      Patient ID: Tricia Gilman is a 29 y o  female  Started Buspar after last visit  Did have COVID in the interim and thought her anxiety was r/t that  Has been having panic attacks  Needing 2 xanax to get her through it  After taking Buspar for 1 week, anxiety seemed to be getting better  Missing appointments with Gus Sheffield  States there are issues with her phone  The following portions of the patient's history were reviewed and updated as appropriate: allergies, current medications, past family history, past medical history, past social history, past surgical history and problem list     Review of Systems   Constitutional: Positive for appetite change and fatigue  Psychiatric/Behavioral: Positive for decreased concentration, dysphoric mood and sleep disturbance  Negative for suicidal ideas  The patient is nervous/anxious            Objective:  Vitals:    02/01/22 1253   BP: 120/85   Pulse: (!) 51   Temp: 97 7 °F (36 5 °C)   SpO2: 100%      Physical Exam  Vitals reviewed  Constitutional:       Appearance: Normal appearance  Cardiovascular:      Rate and Rhythm: Normal rate and regular rhythm  Heart sounds: Normal heart sounds  Pulmonary:      Effort: Pulmonary effort is normal       Breath sounds: Normal breath sounds  Skin:     General: Skin is warm and dry  Neurological:      Mental Status: She is alert and oriented to person, place, and time  Psychiatric:         Mood and Affect: Mood normal          Behavior: Behavior normal          Thought Content:  Thought content normal          Judgment: Judgment normal

## 2022-02-15 ENCOUNTER — TELEPHONE (OUTPATIENT)
Dept: FAMILY MEDICINE CLINIC | Facility: HOSPITAL | Age: 37
End: 2022-02-15

## 2022-02-15 NOTE — TELEPHONE ENCOUNTER
It's likely r/t to missing med dose  It is very important she does not miss doses and takes at the same time every day to avoid these side effects

## 2022-02-15 NOTE — TELEPHONE ENCOUNTER
Patient recently switched from lexapro to effexor  She did miss yesterday's dose  Today after taking her dose, she is feeling dizzy, woozy, nauseaus  Feels weird, doesn't feel that she can even drive      pcb

## 2022-02-22 ENCOUNTER — RA CDI HCC (OUTPATIENT)
Dept: OTHER | Facility: HOSPITAL | Age: 37
End: 2022-02-22

## 2022-02-22 NOTE — PROGRESS NOTES
[facility]  coding opportunities       Chart reviewed, no opportunity found: CHART REVIEWED, NO OPPORTUNITY FOUND                        Patients insurance company: [employer] (Medicare Advantage and Commercial)

## 2022-03-01 ENCOUNTER — TELEMEDICINE (OUTPATIENT)
Dept: FAMILY MEDICINE CLINIC | Facility: HOSPITAL | Age: 37
End: 2022-03-01
Payer: COMMERCIAL

## 2022-03-01 VITALS — BODY MASS INDEX: 25.82 KG/M2 | HEIGHT: 60 IN | WEIGHT: 131.5 LBS

## 2022-03-01 DIAGNOSIS — F41.1 GAD (GENERALIZED ANXIETY DISORDER): ICD-10-CM

## 2022-03-01 DIAGNOSIS — F33.1 MODERATE EPISODE OF RECURRENT MAJOR DEPRESSIVE DISORDER (HCC): Primary | ICD-10-CM

## 2022-03-01 DIAGNOSIS — R53.82 CHRONIC FATIGUE: ICD-10-CM

## 2022-03-01 PROCEDURE — 1036F TOBACCO NON-USER: CPT | Performed by: NURSE PRACTITIONER

## 2022-03-01 PROCEDURE — 3725F SCREEN DEPRESSION PERFORMED: CPT | Performed by: NURSE PRACTITIONER

## 2022-03-01 PROCEDURE — 3008F BODY MASS INDEX DOCD: CPT | Performed by: NURSE PRACTITIONER

## 2022-03-01 PROCEDURE — 99214 OFFICE O/P EST MOD 30 MIN: CPT | Performed by: NURSE PRACTITIONER

## 2022-03-01 RX ORDER — VENLAFAXINE HYDROCHLORIDE 37.5 MG/1
37.5 CAPSULE, EXTENDED RELEASE ORAL
Qty: 30 CAPSULE | Refills: 3 | Status: SHIPPED | OUTPATIENT
Start: 2022-03-01 | End: 2022-04-14 | Stop reason: ALTCHOICE

## 2022-03-01 RX ORDER — BUSPIRONE HYDROCHLORIDE 5 MG/1
5 TABLET ORAL 2 TIMES DAILY
Qty: 120 TABLET | Refills: 1 | Status: SHIPPED | OUTPATIENT
Start: 2022-03-01 | End: 2022-04-14 | Stop reason: ALTCHOICE

## 2022-03-01 NOTE — ASSESSMENT & PLAN NOTE
SARAH-7=4  Overall much improved  Continue on current venlafaxine dose along with Buspar twice daily  No recent Xanax use  F/U in 3 months

## 2022-03-01 NOTE — PROGRESS NOTES
Virtual Regular Visit    Verification of patient location:    Patient is located in the following state in which I hold an active license [place]      Assessment/Plan:    Problem List Items Addressed This Visit        Other    SARAH (generalized anxiety disorder)     SARAH-7=4  Overall much improved  Continue on current venlafaxine dose along with Buspar twice daily  No recent Xanax use  F/U in 3 months  Relevant Medications    busPIRone (BUSPAR) 5 mg tablet    venlafaxine (EFFEXOR-XR) 37 5 mg 24 hr capsule    Chronic fatigue     Much improved on venlafaxine  Moderate episode of recurrent major depressive disorder ([facility] [address] ) - Primary     PHQ-9=1  Overall improved with switch to venlafaxine from lexapro  Pt requesting to stay on this dose  F/U in 3 months  Call with any worsening mood  Relevant Medications    busPIRone (BUSPAR) 5 mg tablet    venlafaxine (EFFEXOR-XR) 37 5 mg 24 hr capsule               Reason for visit is   Chief Complaint   Patient presents with    Follow-up    Virtual Regular Visit        Encounter provider Stephani Chiu    Provider located at [facility] [phone] [place] [address]      Recent Visits  No visits were found meeting these conditions  Showing recent visits within past 7 days and meeting all other requirements  Today's Visits  Date Type Provider Dept   03/01/22 Telemedicine FLEX Chiu [facility] [address]   Showing today's visits and meeting all other requirements  Future Appointments  No visits were found meeting these conditions  Showing future appointments within next 150 days and meeting all other requirements       The patient was identified by name and date of birth   Bettie Henao was informed that this is a telemedicine visit and that the visit is being conducted through [address] [place] [address] Now and patient was informed that this is a secure, HIPAA-compliant platform  She agrees to proceed     My office door was closed  No one else was in the room  She acknowledged consent and understanding of privacy and security of the video platform  The patient has agreed to participate and understands they can discontinue the visit at any time  Patient is aware this is a billable service  Subjective  Jj Asencio is a 29 y o  female    Pt is requesting virtual visit today  Has been feeling much better after switch to venlafaxine from lexapro  Chronic fatigue has subsided  Sleeping normally  Feels refreshed after sleeping  Mood has been better  Dealing with a lot right now and gets overwhelmed easy  Overall better  Once in awhile will wake with anxiety but feels it is manageable  Typically subsides by the afternoon  Has not needed Xanax  Still taking Buspar in morning and night  Feels current venlafaxine dose is effective  Past Medical History:   Diagnosis Date    Anxiety     Cervical dysplasia, mild     Current mild episode of major depressive disorder without prior episode ([facility] [address] ) 5/12/2020    Iron deficiency anemia        Past Surgical History:   Procedure Laterality Date    BREAST SURGERY Right     Lumpectomy     COLPOSCOPY         Current Outpatient Medications   Medication Sig Dispense Refill    ALPRAZolam (XANAX) 0 25 mg tablet Take 1 tablet (0 25 mg total) by mouth 2 (two) times a day as needed for anxiety 15 tablet 0    busPIRone (BUSPAR) 5 mg tablet Take 1 tablet (5 mg total) by mouth 2 (two) times a day 120 tablet 1    venlafaxine (EFFEXOR-XR) 37 5 mg 24 hr capsule Take 1 capsule (37 5 mg total) by mouth daily with breakfast 30 capsule 3     No current facility-administered medications for this visit          No Known Allergies    Review of Systems    Video Exam    Vitals:    03/01/22 0950   Weight: 59 6 kg (131 lb 8 oz)   Height: 5' (1 524 m)       Physical Exam     I spent 15 minutes directly with the patient during this visit    VIRTUAL VISIT Juliajoe Juancarlos 30 verbally agrees to participate in [employer]  Pt is aware that [employer] could be limited without vital signs or the ability to perform a full hands-on physical Martinez Ramirez understands she or the provider may request at any time to terminate the video visit and request the patient to seek care or treatment in person

## 2022-03-01 NOTE — ASSESSMENT & PLAN NOTE
PHQ-9=1  Overall improved with switch to venlafaxine from lexapro  Pt requesting to stay on this dose  F/U in 3 months  Call with any worsening mood

## 2022-04-14 ENCOUNTER — TELEMEDICINE (OUTPATIENT)
Dept: FAMILY MEDICINE CLINIC | Facility: HOSPITAL | Age: 37
End: 2022-04-14
Payer: COMMERCIAL

## 2022-04-14 VITALS — BODY MASS INDEX: 25.72 KG/M2 | WEIGHT: 131 LBS | HEIGHT: 60 IN

## 2022-04-14 DIAGNOSIS — F33.1 MODERATE EPISODE OF RECURRENT MAJOR DEPRESSIVE DISORDER (HCC): Primary | ICD-10-CM

## 2022-04-14 DIAGNOSIS — F41.1 GAD (GENERALIZED ANXIETY DISORDER): ICD-10-CM

## 2022-04-14 DIAGNOSIS — Z3A.01 LESS THAN 8 WEEKS GESTATION OF PREGNANCY: ICD-10-CM

## 2022-04-14 PROCEDURE — 1036F TOBACCO NON-USER: CPT | Performed by: NURSE PRACTITIONER

## 2022-04-14 PROCEDURE — 99213 OFFICE O/P EST LOW 20 MIN: CPT | Performed by: NURSE PRACTITIONER

## 2022-04-14 PROCEDURE — 3725F SCREEN DEPRESSION PERFORMED: CPT | Performed by: NURSE PRACTITIONER

## 2022-04-14 PROCEDURE — 3008F BODY MASS INDEX DOCD: CPT | Performed by: NURSE PRACTITIONER

## 2022-04-14 NOTE — PROGRESS NOTES
Virtual Regular Visit    Verification of patient location:    Patient is located in the following state in which I hold an active license [place]      Assessment/Plan:    Problem List Items Addressed This Visit        Other    SARAH (generalized anxiety disorder)     Well controlled  Pregnant and instructed to stop Buspar  Moderate episode of recurrent major depressive disorder ([facility] [address] ) - Primary     Well controlled  Would like to come off medication  Continue wean of venlafaxine  Take every other day for 1 week then stop  If symptoms bothersome then can do every 3 day dosing  Discussed with pt that there are medications that can be used safely in pregnancy should she choose this  Other Visit Diagnoses     Less than 8 weeks gestation of pregnancy                   Reason for visit is   Chief Complaint   Patient presents with    Follow-up    Virtual Regular Visit        Encounter provider FLEX Humphries    Provider located at [id number] [facility] [id number]      Recent Visits  No visits were found meeting these conditions  Showing recent visits within past 7 days and meeting all other requirements  Today's Visits  Date Type Provider Dept   04/14/22 Telemedicine FLEX Humphries [facility]   Showing today's visits and meeting all other requirements  Future Appointments  No visits were found meeting these conditions  Showing future appointments within next 150 days and meeting all other requirements       The patient was identified by name and date of birth  Liudmila Foy was informed that this is a telemedicine visit and that the visit is being conducted through [address] [place] [address] Road [facility] and patient was informed that this is a secure, HIPAA-compliant platform  She agrees to proceed     My office door was closed  No one else was in the room    She acknowledged consent and understanding of privacy and security of the video platform  The patient has agreed to participate and understands they can discontinue the visit at any time  Patient is aware this is a billable service  Subjective  Mari Pérez is a 29 y o  female    Everything is going very well  No panic attacks or anxiety  Just found out she was pregnant  About 4 weeks  Has started to take her venlafaxine every other day  Has noticed lightheadedness and feeling off  She has decreased Buspar to once daily  Wants to come off meds for her pregnancy  Past Medical History:   Diagnosis Date    Anxiety     Cervical dysplasia, mild     Current mild episode of major depressive disorder without prior episode ([facility] [address] ) 5/12/2020    Iron deficiency anemia        Past Surgical History:   Procedure Laterality Date    BREAST SURGERY Right     Lumpectomy     COLPOSCOPY         No current outpatient medications on file  No current facility-administered medications for this visit  No Known Allergies    Review of Systems   Constitutional: Negative for activity change, appetite change, fatigue and unexpected weight change  Psychiatric/Behavioral: Negative for dysphoric mood, sleep disturbance and suicidal ideas  The patient is not nervous/anxious  Video Exam    Vitals:    04/14/22 1018   Weight: 59 4 kg (131 lb)   Height: 5' (1 524 m)       Physical Exam  Vitals reviewed  Constitutional:       General: She is not in acute distress  Appearance: Normal appearance  Pulmonary:      Effort: Pulmonary effort is normal    Neurological:      Mental Status: She is alert and oriented to person, place, and time  Psychiatric:         Mood and Affect: Mood normal          Behavior: Behavior normal          Thought Content:  Thought content normal          Judgment: Judgment normal           I spent 15 minutes directly with the patient during this visit    [address]  [facility] verbally agrees to participate in [employer]  Pt is aware that [employer] could be limited without vital signs or the ability to perform a full hands-on physical Phoebe Bue understands she or the provider may request at any time to terminate the video visit and request the patient to seek care or treatment in person

## 2022-04-14 NOTE — ASSESSMENT & PLAN NOTE
Well controlled  Would like to come off medication  Continue wean of venlafaxine  Take every other day for 1 week then stop  If symptoms bothersome then can do every 3 day dosing  Discussed with pt that there are medications that can be used safely in pregnancy should she choose this

## 2022-06-03 ENCOUNTER — ROUTINE PRENATAL (OUTPATIENT)
Dept: PERINATAL CARE | Facility: OTHER | Age: 37
End: 2022-06-03
Payer: COMMERCIAL

## 2022-06-03 VITALS
BODY MASS INDEX: 25.95 KG/M2 | DIASTOLIC BLOOD PRESSURE: 69 MMHG | SYSTOLIC BLOOD PRESSURE: 111 MMHG | WEIGHT: 132.2 LBS | HEIGHT: 60 IN | HEART RATE: 72 BPM

## 2022-06-03 DIAGNOSIS — Z3A.13 13 WEEKS GESTATION OF PREGNANCY: ICD-10-CM

## 2022-06-03 DIAGNOSIS — O43.199 MARGINAL INSERTION OF UMBILICAL CORD AFFECTING MANAGEMENT OF MOTHER: ICD-10-CM

## 2022-06-03 DIAGNOSIS — O09.899 SUPERVISION OF OTHER HIGH RISK PREGNANCIES, UNSPECIFIED TRIMESTER: ICD-10-CM

## 2022-06-03 DIAGNOSIS — O09.291 HISTORY OF MACROSOMIA IN INFANT IN PRIOR PREGNANCY, CURRENTLY PREGNANT IN FIRST TRIMESTER: ICD-10-CM

## 2022-06-03 DIAGNOSIS — O09.529 ANTEPARTUM MULTIGRAVIDA OF ADVANCED MATERNAL AGE: Primary | ICD-10-CM

## 2022-06-03 PROCEDURE — 76813 OB US NUCHAL MEAS 1 GEST: CPT | Performed by: OBSTETRICS & GYNECOLOGY

## 2022-06-03 PROCEDURE — 1036F TOBACCO NON-USER: CPT | Performed by: OBSTETRICS & GYNECOLOGY

## 2022-06-03 PROCEDURE — 76801 OB US < 14 WKS SINGLE FETUS: CPT | Performed by: OBSTETRICS & GYNECOLOGY

## 2022-06-03 PROCEDURE — 99241 PR OFFICE CONSULTATION NEW/ESTAB PATIENT 15 MIN: CPT | Performed by: OBSTETRICS & GYNECOLOGY

## 2022-06-03 PROCEDURE — 3008F BODY MASS INDEX DOCD: CPT | Performed by: OBSTETRICS & GYNECOLOGY

## 2022-06-03 RX ORDER — PNV,CALCIUM 72/IRON/FOLIC ACID 27 MG-1 MG
1 TABLET ORAL DAILY
COMMUNITY
Start: 2022-05-15

## 2022-06-03 NOTE — PROGRESS NOTES
OFFICE CONSULT  Referring physician:   Windy Roberts Md  [address] [employer]  [facility]  [address]      Dear Dr Myers Son      Thank you for requesting a  consultation on your patient Ms Shakira Schmidt for the following indications:  Nuchal translucency screening  She reports that she is recently had NIPT drawn through her OB office which is not returned yet  History  Medications:  Prenatal vitamins  Allergies to medications:  None  Past medical history:  Advanced maternal age, history of anxiety and depression diagnosed during   She was on Effexor and BuSpar which she stopped in pregnancy  She feels she is stable off medication  Past surgical history:  Lumpectomy which was benign and history of colposcopy for cervical dysplasia  Past obstetrical history:  History of 4 prior spontaneous vaginal deliveries that were all macrosomic weighing between 9 pounds to 11 pounds 11 ounces between the years of  to   She denies any history of diabetes in those pregnancies or complications with delivery  Social history:  Denies substance use  First generation family history:  Early onset cancer was diagnosed in her mother at age 34 and she is not sure if it was ovarian or uterine  Ultrasound findings: The ultrasound shows a fetus concordant with dates  The nasal bone and nuchal translucency appears normal  No malformations are seen on today's early ultrasound  There is a marginal placental cord insertion  The patient was informed of the findings and counseled about the limitations of the exam in detecting all forms of fetal congenital abnormalities  She does not report any vaginal bleeding or uterine cramping or contractions  Future tests recommended:  1  The results of her NIPT will return in 7-10 days after her blood draw and her OB office will order an MSAFP screen at 16-18 weeks to screen for spina bifida  Future ultrasounds ordered today:   1   Fetal Level II ultrasound imaging is recommended at 19-20 weeks' gestation  Recommend a follow-up with 3rd trimester growth scan given her multiple risk factors for growth abnormalities which include advanced maternal age, history of macrosomia and marginal placental cord insertion noted today  This will be scheduled after her next ultrasound  The face to face time, in addition to time spent discussing ultrasound results, was approximately 15 minutes, greater than 50% of which was spent during counseling and coordination of care  Kenyon Bui MD       Addendum: 6/16/22 [facility] ob office reports her LMP was uncertain so her BEVERLY is based on her earliest scan  I have updated her report

## 2022-06-03 NOTE — LETTER
2022     North Nino MD  [address]  [employer]  [facility]  [address] [place]    Patient: Luis Miguel Justin   YOB: 1985   Date of Visit: 6/3/2022       Dear Dr Zonia Rainey: Thank you for referring Shreya Theodore to me for evaluation  Below are my notes for this consultation  If you have questions, please do not hesitate to call me  I look forward to following your patient along with you  Sincerely,        Jori Gramajo MD        CC: No Recipients  Jori Gramajo MD  2022 12:36 AM  Addendum  OFFICE CONSULT  Referring physician:   North Nino Md  [address]  [employer]  [facility]  [address] [place] [address], [place]      Dear Dr Zonia Rainey      Thank you for requesting a  consultation on your patient Ms  Luis Miguel Justin for the following indications:  Nuchal translucency screening  She reports that she is recently had NIPT drawn through her OB office which is not returned yet  History  Medications:  Prenatal vitamins  Allergies to medications:  None  Past medical history:  Advanced maternal age, history of anxiety and depression diagnosed during [facility]  She was on Effexor and BuSpar which she stopped in pregnancy  She feels she is stable off medication  Past surgical history:  Lumpectomy which was benign and history of colposcopy for cervical dysplasia  Past obstetrical history:  History of 4 prior spontaneous vaginal deliveries that were all macrosomic weighing between 9 pounds to 11 pounds 11 ounces between the years of  to 2016  She denies any history of diabetes in those pregnancies or complications with delivery  Social history:  Denies substance use  First generation family history:  Early onset cancer was diagnosed in her mother at age 34 and she is not sure if it was ovarian or uterine  Ultrasound findings: The ultrasound shows a fetus concordant with dates   The nasal bone and nuchal translucency appears normal  No malformations are seen on today's early ultrasound  There is a marginal placental cord insertion  The patient was informed of the findings and counseled about the limitations of the exam in detecting all forms of fetal congenital abnormalities  She does not report any vaginal bleeding or uterine cramping or contractions  Future tests recommended:  1  The results of her NIPT will return in 7-10 days after her blood draw and her OB office will order an MSAFP screen at 16-18 weeks to screen for spina bifida  Future ultrasounds ordered today:   1  Fetal Level II ultrasound imaging is recommended at 19-20 weeks' gestation  Recommend a follow-up with 3rd trimester growth scan given her multiple risk factors for growth abnormalities which include advanced maternal age, history of macrosomia and marginal placental cord insertion noted today  This will be scheduled after her next ultrasound  The face to face time, in addition to time spent discussing ultrasound results, was approximately 15 minutes, greater than 50% of which was spent during counseling and coordination of care  Juvencio Lanza MD       Addendum: 6/16/22 [facility] ob office reports her LMP was uncertain so her BEVERLY is based on her earliest scan  I have updated her report

## 2022-06-03 NOTE — LETTER
2022     Windy Roberts MD  [address]  [facility]  [address] [place] [address], [place]    Patient: Shakira Schmidt   YOB: 1985   Date of Visit: 6/3/2022       Dear Dr Myers Son: Thank you for referring Yana Arroyo to me for evaluation  Below are my notes for this consultation  If you have questions, please do not hesitate to call me  I look forward to following your patient along with you  Sincerely,        Hillary Diamond MD        CC: No Recipients  Hillary Diamond MD  2022  7:36 AM  Sign when Signing Visit  OFFICE CONSULT  Referring physician:   Windy Roberts Md  [address]  [employer]  [facility]  [address] [place] [address], [place]      Dear Dr Myers Son      Thank you for requesting a  consultation on your patient Ms  Shakira Schmidt for the following indications:  Nuchal translucency screening  She reports that she is recently had NIPT drawn through her OB office which is not returned yet  History  Medications:  Prenatal vitamins  Allergies to medications:  None  Past medical history:  Advanced maternal age, history of anxiety and depression diagnosed during [facility]  She was on Effexor and BuSpar which she stopped in pregnancy  She feels she is stable off medication  Past surgical history:  Lumpectomy which was benign and history of colposcopy for cervical dysplasia  Past obstetrical history:  History of 4 prior spontaneous vaginal deliveries that were all macrosomic weighing between 9 pounds to 11 pounds 11 ounces between the years of  to 2016  She denies any history of diabetes in those pregnancies or complications with delivery  Social history:  Denies substance use  First generation family history:  Early onset cancer was diagnosed in her mother at age 34 and she is not sure if it was ovarian or uterine  Ultrasound findings: The ultrasound shows a fetus concordant with dates   The nasal bone and nuchal translucency appears normal  No malformations are seen on today's early ultrasound  There is a marginal placental cord insertion  The patient was informed of the findings and counseled about the limitations of the exam in detecting all forms of fetal congenital abnormalities  She does not report any vaginal bleeding or uterine cramping or contractions  Future tests recommended:  1  The results of her NIPT will return in 7-10 days after her blood draw and her OB office will order an MSAFP screen at 16-18 weeks to screen for spina bifida  Future ultrasounds ordered today:   1  Fetal Level II ultrasound imaging is recommended at 19-20 weeks' gestation  Recommend a follow-up with 3rd trimester growth scan given her multiple risk factors for growth abnormalities which include advanced maternal age, history of macrosomia and marginal placental cord insertion noted today  This will be scheduled after her next ultrasound  The face to face time, in addition to time spent discussing ultrasound results, was approximately 15 minutes, greater than 50% of which was spent during counseling and coordination of care      Wilda Tubbs MD

## 2022-06-04 PROBLEM — O43.199 MARGINAL INSERTION OF UMBILICAL CORD AFFECTING MANAGEMENT OF MOTHER: Status: ACTIVE | Noted: 2022-06-04

## 2022-06-04 PROBLEM — O09.529 ANTEPARTUM MULTIGRAVIDA OF ADVANCED MATERNAL AGE: Status: ACTIVE | Noted: 2022-06-04

## 2022-06-04 PROBLEM — O09.291 HISTORY OF MACROSOMIA IN INFANT IN PRIOR PREGNANCY, CURRENTLY PREGNANT IN FIRST TRIMESTER: Status: ACTIVE | Noted: 2022-06-04

## 2022-06-07 ENCOUNTER — TELEPHONE (OUTPATIENT)
Dept: PERINATAL CARE | Facility: CLINIC | Age: 37
End: 2022-06-07

## 2022-06-07 NOTE — TELEPHONE ENCOUNTER
Per Maricarmen Huerta at [facility], patient's due date per ultra sound needs to be updated in her most recent report  Message sent to Dr Lowry Sat

## 2022-07-19 ENCOUNTER — TELEPHONE (OUTPATIENT)
Dept: PERINATAL CARE | Facility: CLINIC | Age: 37
End: 2022-07-19

## 2022-07-19 NOTE — TELEPHONE ENCOUNTER
Left patient a message that her MFM appointment had to be rescheduled  The new time 10:00AM, date July 22 and location were provided  The patient has been instructed to please call us back at [phone] with any questions or concerns

## 2022-07-22 ENCOUNTER — ROUTINE PRENATAL (OUTPATIENT)
Dept: PERINATAL CARE | Facility: OTHER | Age: 37
End: 2022-07-22
Payer: COMMERCIAL

## 2022-07-22 VITALS
SYSTOLIC BLOOD PRESSURE: 114 MMHG | WEIGHT: 142.6 LBS | HEIGHT: 60 IN | BODY MASS INDEX: 28 KG/M2 | DIASTOLIC BLOOD PRESSURE: 78 MMHG | HEART RATE: 87 BPM

## 2022-07-22 DIAGNOSIS — O44.02 PLACENTA PREVIA, SECOND TRIMESTER: ICD-10-CM

## 2022-07-22 DIAGNOSIS — O09.522 ELDERLY MULTIGRAVIDA, SECOND TRIMESTER: Primary | ICD-10-CM

## 2022-07-22 DIAGNOSIS — Z36.86 ENCOUNTER FOR ANTENATAL SCREENING FOR CERVICAL LENGTH: ICD-10-CM

## 2022-07-22 DIAGNOSIS — Z3A.20 20 WEEKS GESTATION OF PREGNANCY: ICD-10-CM

## 2022-07-22 PROCEDURE — 76817 TRANSVAGINAL US OBSTETRIC: CPT | Performed by: OBSTETRICS & GYNECOLOGY

## 2022-07-22 PROCEDURE — 76811 OB US DETAILED SNGL FETUS: CPT | Performed by: OBSTETRICS & GYNECOLOGY

## 2022-07-22 PROCEDURE — 99213 OFFICE O/P EST LOW 20 MIN: CPT | Performed by: OBSTETRICS & GYNECOLOGY

## 2022-07-25 ENCOUNTER — TELEPHONE (OUTPATIENT)
Dept: PERINATAL CARE | Facility: OTHER | Age: 37
End: 2022-07-25

## 2022-07-25 NOTE — TELEPHONE ENCOUNTER
Called patient to schedule follow up appointment:     Return in about 8 weeks  (around 9/16/2022) for Fetal growth ultrasound, Transvaginal ultrasound  Spoke with patient and scheduled appointment - 9/23/22  3:30  [facility]

## 2022-08-22 ENCOUNTER — OFFICE VISIT (OUTPATIENT)
Dept: FAMILY MEDICINE CLINIC | Facility: HOSPITAL | Age: 37
End: 2022-08-22
Payer: COMMERCIAL

## 2022-08-22 VITALS
OXYGEN SATURATION: 99 % | HEART RATE: 86 BPM | DIASTOLIC BLOOD PRESSURE: 68 MMHG | SYSTOLIC BLOOD PRESSURE: 116 MMHG | WEIGHT: 151 LBS | HEIGHT: 60 IN | TEMPERATURE: 98.1 F | BODY MASS INDEX: 29.64 KG/M2

## 2022-08-22 DIAGNOSIS — Z00.00 ANNUAL PHYSICAL EXAM: Primary | ICD-10-CM

## 2022-08-22 DIAGNOSIS — Z23 ENCOUNTER FOR IMMUNIZATION: ICD-10-CM

## 2022-08-22 PROCEDURE — 99395 PREV VISIT EST AGE 18-39: CPT | Performed by: NURSE PRACTITIONER

## 2022-08-22 PROCEDURE — 86580 TB INTRADERMAL TEST: CPT

## 2022-08-22 PROCEDURE — 3725F SCREEN DEPRESSION PERFORMED: CPT | Performed by: NURSE PRACTITIONER

## 2022-08-22 NOTE — PATIENT INSTRUCTIONS

## 2022-08-22 NOTE — PROGRESS NOTES
[facility] [address]     NAME: Teetee Allred  AGE: 40 y o  SEX: female  : 1985     DATE: 2022     Assessment and Plan:     Problem List Items Addressed This Visit    None     Visit Diagnoses     Annual physical exam    -  Primary          Immunizations and preventive care screenings were discussed with patient today  Appropriate education was printed on patient's after visit summary  Counseling:  Alcohol/drug use: discussed moderation in alcohol intake, the recommendations for healthy alcohol use, and avoidance of illicit drug use  Dental Health: discussed importance of regular tooth brushing, flossing, and dental visits  Injury prevention: discussed safety/seat belts, safety helmets, smoke detectors, carbon dioxide detectors, and smoking near bedding or upholstery  Sexual health: discussed sexually transmitted diseases, partner selection, use of condoms, avoidance of unintended pregnancy, and contraceptive alternatives  · Exercise: the importance of regular exercise/physical activity was discussed  Recommend exercise 3-5 times per week for at least 30 minutes  Return in 1 year (on 2023)  Chief Complaint:     Chief Complaint   Patient presents with    Annual Exam      History of Present Illness:     Adult Annual Physical   Patient here for a comprehensive physical exam  The patient reports no problems  Diet and Physical Activity  · Diet/Nutrition: well balanced diet  · Exercise: no formal exercise        Depression Screening  PHQ-2/9 Depression Screening    Little interest or pleasure in doing things: 0 - not at all  Feeling down, depressed, or hopeless: 0 - not at all  Trouble falling or staying asleep, or sleeping too much: 1 - several days  Feeling tired or having little energy: 1 - several days  Poor appetite or overeatin - not at all  Feeling bad about yourself - or that you are a failure or have let yourself or your family down: 0 - not at all  Trouble concentrating on things, such as reading the newspaper or watching television: 0 - not at all  Moving or speaking so slowly that other people could have noticed  Or the opposite - being so fidgety or restless that you have been moving around a lot more than usual: 0 - not at all  Thoughts that you would be better off dead, or of hurting yourself in some way: 0 - not at all  PHQ-9 Score: 2   PHQ-9 Interpretation: No or Minimal depression        General Health  · Sleep: sleeps well  · Hearing: normal - bilateral   · Vision: goes for regular eye exams, most recent eye exam >1 year ago and wears contacts  · Dental: regular dental visits  /GYN Health  · Last menstrual period: currently pregnant  · Contraceptive method: currently pregnant  · History of STDs?: no      Review of Systems:     Review of Systems   Constitutional: Negative  HENT: Negative  Negative for congestion, dental problem, ear pain, hearing loss, postnasal drip, rhinorrhea, sinus pressure, sinus pain, sore throat, tinnitus and trouble swallowing  Eyes: Negative  Respiratory: Negative  Cardiovascular: Negative  Gastrointestinal: Negative  Endocrine: Negative  Genitourinary: Negative  Musculoskeletal: Negative  Skin: Negative  Allergic/Immunologic: Negative  Neurological: Negative  Hematological: Negative  Psychiatric/Behavioral: Negative         Past Medical History:     Past Medical History:   Diagnosis Date    Anxiety     Cervical dysplasia, mild     [facility]    Iron deficiency anemia       Past Surgical History:     Past Surgical History:   Procedure Laterality Date    BREAST SURGERY Right     Lumpectomy     COLPOSCOPY        Social History:     Social History     Socioeconomic History    Marital status: Single     Spouse name: None    Number of children: None    Years of education: None    Highest education level: None   Occupational History    None   Tobacco Use    Smoking status: Former Smoker    Smokeless tobacco: Never Used   Vaping Use    Vaping Use: Never used   Substance and Sexual Activity    Alcohol use: Not Currently    Drug use: No    Sexual activity: None   Other Topics Concern    None   Social History Narrative    Caffeine use    Current some day smoker - As per [employer]    Smokes cigarettes - As per [employer]      Social Determinants of Health     Financial Resource Strain: Not on file   Food Insecurity: Not on file   Transportation Needs: Not on file   Physical Activity: Not on file   Stress: Not on file   Social Connections: Not on file   Intimate Partner Violence: Not on file   Housing Stability: Not on file      Family History:     Family History   Problem Relation Age of Onset    Lung cancer Family     Ovarian cancer Family       Current Medications:     Current Outpatient Medications   Medication Sig Dispense Refill    Prenatal Vit-Fe Fumarate-FA (PrePLUS) 27-1 MG TABS Take 1 tablet by mouth daily       No current facility-administered medications for this visit  Allergies:     No Known Allergies   Physical Exam:     /68 (BP Location: Left arm, Patient Position: Sitting, Cuff Size: Standard)   Pulse 86   Temp 98 1 °F (36 7 °C) (Tympanic)   Ht 5' (1 524 m)   Wt 68 5 kg (151 lb)   LMP 03/02/2022   SpO2 99%   BMI 29 49 kg/m²     Physical Exam  Vitals reviewed  Constitutional:       Appearance: Normal appearance  She is normal weight  HENT:      Head: Normocephalic and atraumatic  Right Ear: Tympanic membrane, ear canal and external ear normal       Left Ear: Tympanic membrane, ear canal and external ear normal       Nose: Nose normal       Mouth/Throat:      Mouth: Mucous membranes are moist       Pharynx: Oropharynx is clear     Eyes:      Conjunctiva/sclera: Conjunctivae normal  Pupils: Pupils are equal, round, and reactive to light  Cardiovascular:      Rate and Rhythm: Normal rate and regular rhythm  Heart sounds: Normal heart sounds  No murmur heard  Pulmonary:      Effort: Pulmonary effort is normal       Breath sounds: Normal breath sounds  Abdominal:      General: Bowel sounds are normal       Palpations: Abdomen is soft  There is no hepatomegaly or splenomegaly  Tenderness: There is no abdominal tenderness  Comments: Gravid abdomen   Musculoskeletal:         General: Normal range of motion  Cervical back: Normal range of motion and neck supple  Skin:     General: Skin is warm and dry  Capillary Refill: Capillary refill takes less than 2 seconds  Neurological:      General: No focal deficit present  Mental Status: She is alert and oriented to person, place, and time  Psychiatric:         Mood and Affect: Mood normal          Behavior: Behavior normal          Thought Content:  Thought content normal          Judgment: Judgment normal           Yanet Moe, [address] [id number]

## 2022-08-29 ENCOUNTER — CLINICAL SUPPORT (OUTPATIENT)
Dept: FAMILY MEDICINE CLINIC | Facility: HOSPITAL | Age: 37
End: 2022-08-29
Payer: COMMERCIAL

## 2022-08-29 DIAGNOSIS — Z11.1 ENCOUNTER FOR PPD TEST: Primary | ICD-10-CM

## 2022-08-29 PROCEDURE — 96372 THER/PROPH/DIAG INJ SC/IM: CPT | Performed by: NURSE PRACTITIONER

## 2022-08-29 PROCEDURE — 86580 TB INTRADERMAL TEST: CPT

## 2022-08-31 ENCOUNTER — CLINICAL SUPPORT (OUTPATIENT)
Dept: FAMILY MEDICINE CLINIC | Facility: HOSPITAL | Age: 37
End: 2022-08-31

## 2022-08-31 DIAGNOSIS — Z11.1 ENCOUNTER FOR PPD TEST: Primary | ICD-10-CM

## 2022-08-31 LAB
INDURATION: NEGATIVE MM
TB SKIN TEST: NEGATIVE

## 2022-09-22 NOTE — PATIENT INSTRUCTIONS
Thank you for choosing us for your  care today  If you have any questions about your ultrasound or care, please do not hesitate to contact us or your primary obstetrician  Some general instructions for your pregnancy are:    Protect against coronavirus: get vaccinated - pregnant women are increased risk of severe COVID  Notify your primary care doctor if you have any symptoms  Exercise: Aim for 22 minutes per day (150 minutes per week) of regular exercise  Walking is great! Nutrition: aim for calcium-rich and iron-rich foods as well as healthy sources of protein  Learn about Preeclampsia: preeclampsia is a common, serious high blood pressure complication in pregnancy  A blood pressure of 732EASO (systolic or top number) or 98RLIO (diastolic or bottom number) is not normal and needs evaluation by your doctor  Aspirin is sometimes prescribed in early pregnancy to prevent preeclampsia in women with risk factors - ask your obstetrician if you should be on this medication  If you smoke, try to reduce how many cigarettes you smoke or try to quit completely  Do not vape  Other warning signs to watch out for in pregnancy or postpartum: chest pain, obstructed breathing or shortness of breath, seizures, thoughts of hurting yourself or your baby, bleeding, a painful or swollen leg, fever, or headache (see [facility] POST-BIRTH Warning Signs campaign)  If these happen call 911  Itching is also not normal in pregnancy and if you experience this, especially over your hands and feet, potentially worse at night, notify your doctors

## 2022-09-23 ENCOUNTER — ULTRASOUND (OUTPATIENT)
Dept: PERINATAL CARE | Facility: OTHER | Age: 37
End: 2022-09-23
Payer: COMMERCIAL

## 2022-09-23 VITALS
DIASTOLIC BLOOD PRESSURE: 66 MMHG | HEART RATE: 85 BPM | SYSTOLIC BLOOD PRESSURE: 124 MMHG | WEIGHT: 149.8 LBS | HEIGHT: 60 IN | BODY MASS INDEX: 29.41 KG/M2

## 2022-09-23 DIAGNOSIS — O44.02 PLACENTA PREVIA, SECOND TRIMESTER: Primary | ICD-10-CM

## 2022-09-23 DIAGNOSIS — O44.40 LOW-LYING PLACENTA: ICD-10-CM

## 2022-09-23 DIAGNOSIS — O09.291 HISTORY OF MACROSOMIA IN INFANT IN PRIOR PREGNANCY, CURRENTLY PREGNANT IN FIRST TRIMESTER: ICD-10-CM

## 2022-09-23 DIAGNOSIS — Z3A.29 29 WEEKS GESTATION OF PREGNANCY: ICD-10-CM

## 2022-09-23 DIAGNOSIS — O43.199 MARGINAL INSERTION OF UMBILICAL CORD AFFECTING MANAGEMENT OF MOTHER: ICD-10-CM

## 2022-09-23 DIAGNOSIS — Z36.89 ENCOUNTER FOR ULTRASOUND TO ASSESS FETAL GROWTH: ICD-10-CM

## 2022-09-23 DIAGNOSIS — O09.529 ANTEPARTUM MULTIGRAVIDA OF ADVANCED MATERNAL AGE: ICD-10-CM

## 2022-09-23 DIAGNOSIS — Z36.2 ENCOUNTER FOR FOLLOW-UP ULTRASOUND OF FETAL ANATOMY: ICD-10-CM

## 2022-09-23 PROCEDURE — 76817 TRANSVAGINAL US OBSTETRIC: CPT | Performed by: OBSTETRICS & GYNECOLOGY

## 2022-09-23 PROCEDURE — 76816 OB US FOLLOW-UP PER FETUS: CPT | Performed by: OBSTETRICS & GYNECOLOGY

## 2022-09-23 PROCEDURE — 99213 OFFICE O/P EST LOW 20 MIN: CPT | Performed by: OBSTETRICS & GYNECOLOGY

## 2022-09-23 NOTE — PROGRESS NOTES
Ultrasound Probe Disinfection    A transvaginal ultrasound was performed  Prior to use, disinfection was performed with High Level Disinfection Process (Trophon)  Probe serial number [id number] was used        Su Mills  09/23/22  3:30 PM

## 2022-09-23 NOTE — LETTER
2022    Ruben Haines MD  [address]  [employer]  [facility]  [address] [place] [address], [place]    Patient: Fran Palacios   YOB: 1985   Date of Visit: 2022   Gestational age Vania Pimentelfantasma of this communication: Routine though please note no more previa but has a LLP       Dear Elton Holley,    This patient was seen recently in our  office  Consultation is contained in body of ultrasound report which has been faxed to you under separate cover; please contact us if you do not receive this  Please don't hesitate to contact our office with any concerns or questions       Sincerely,      Tasia Christian MD  Attending Physician, Janeth

## 2022-09-24 PROBLEM — O44.40 LOW-LYING PLACENTA: Status: ACTIVE | Noted: 2022-09-24

## 2022-09-24 PROBLEM — O44.02 PLACENTA PREVIA, SECOND TRIMESTER: Status: RESOLVED | Noted: 2022-07-22 | Resolved: 2022-09-24

## 2022-09-24 NOTE — PROGRESS NOTES
[id number] [facility]: Ms Pat Olivera was seen today for followup placental location ultrasound with fetal growth measurement  See ultrasound report under "OB Procedures" tab  The time spent on this established patient on the encounter date included 5 minutes previsit service time reviewing records and precharting, 10 minutes face-to-face service time counseling regarding results and coordinating care, and  5 minutes charting, totalling 20 minutes  Please don't hesitate to contact our office with any concerns or questions    Prince Nelson MD

## 2022-10-14 ENCOUNTER — ROUTINE PRENATAL (OUTPATIENT)
Dept: PERINATAL CARE | Facility: OTHER | Age: 37
End: 2022-10-14
Payer: COMMERCIAL

## 2022-10-14 VITALS
HEART RATE: 76 BPM | WEIGHT: 149 LBS | HEIGHT: 60 IN | BODY MASS INDEX: 29.25 KG/M2 | DIASTOLIC BLOOD PRESSURE: 70 MMHG | SYSTOLIC BLOOD PRESSURE: 106 MMHG

## 2022-10-14 DIAGNOSIS — Z3A.33 33 WEEKS GESTATION OF PREGNANCY: ICD-10-CM

## 2022-10-14 DIAGNOSIS — O09.529 ANTEPARTUM MULTIGRAVIDA OF ADVANCED MATERNAL AGE: Primary | ICD-10-CM

## 2022-10-14 DIAGNOSIS — Z03.72 PLACENTAL PROBLEM SUSPECTED BUT NOT FOUND: ICD-10-CM

## 2022-10-14 PROBLEM — O44.40 LOW-LYING PLACENTA: Status: RESOLVED | Noted: 2022-09-24 | Resolved: 2022-10-14

## 2022-10-14 PROCEDURE — 76817 TRANSVAGINAL US OBSTETRIC: CPT | Performed by: OBSTETRICS & GYNECOLOGY

## 2022-10-14 PROCEDURE — 76815 OB US LIMITED FETUS(S): CPT | Performed by: OBSTETRICS & GYNECOLOGY

## 2022-10-14 PROCEDURE — 99212 OFFICE O/P EST SF 10 MIN: CPT | Performed by: OBSTETRICS & GYNECOLOGY

## 2022-10-14 NOTE — PROGRESS NOTES
Ultrasound Probe Disinfection    A transvaginal ultrasound was performed  Prior to use, disinfection was performed with High Level Disinfection Process (Trophon)  Probe serial number [id number] was used        Adali Silva  10/14/22  3:42 PM

## 2022-10-14 NOTE — PROGRESS NOTES
The patient was seen today for an ultrasound  Please see ultrasound report (located under Ob Procedures) for additional details  Thank you very much for allowing us to participate in the care of this very nice patient  Should you have any questions, please do not hesitate to contact me  Yosi Santillan MD [address] [place]  Attending Physician, Janeth

## 2022-11-21 ENCOUNTER — TELEPHONE (OUTPATIENT)
Dept: FAMILY MEDICINE CLINIC | Facility: HOSPITAL | Age: 37
End: 2022-11-21

## 2022-11-21 DIAGNOSIS — K04.7 TOOTH INFECTION: Primary | ICD-10-CM

## 2022-11-21 RX ORDER — AMOXICILLIN 875 MG/1
875 TABLET, COATED ORAL 2 TIMES DAILY
Qty: 14 TABLET | Refills: 0 | Status: SHIPPED | OUTPATIENT
Start: 2022-11-21 | End: 2022-11-28

## 2022-11-21 NOTE — TELEPHONE ENCOUNTER
Please advise what you recommend
Pt aware 
Pt has a bad tooth that is painful, called dentist and was told they would get back to her  Pt states she needs abx and is 8 mos pregnant and dentist office does not get back to her  Asked if something could be called in, got disconnected   PCB
nausea/vomiting

## 2023-07-06 ENCOUNTER — OFFICE VISIT (OUTPATIENT)
Dept: FAMILY MEDICINE CLINIC | Facility: HOSPITAL | Age: 38
End: 2023-07-06
Payer: COMMERCIAL

## 2023-07-06 VITALS
WEIGHT: 133 LBS | BODY MASS INDEX: 26.11 KG/M2 | TEMPERATURE: 97 F | DIASTOLIC BLOOD PRESSURE: 60 MMHG | HEART RATE: 66 BPM | SYSTOLIC BLOOD PRESSURE: 112 MMHG | HEIGHT: 60 IN | OXYGEN SATURATION: 99 %

## 2023-07-06 DIAGNOSIS — F33.1 MODERATE EPISODE OF RECURRENT MAJOR DEPRESSIVE DISORDER (HCC): ICD-10-CM

## 2023-07-06 DIAGNOSIS — F41.1 GAD (GENERALIZED ANXIETY DISORDER): Primary | ICD-10-CM

## 2023-07-06 PROBLEM — F43.21 GRIEF REACTION: Status: RESOLVED | Noted: 2021-09-28 | Resolved: 2023-07-06

## 2023-07-06 PROBLEM — O09.291 HISTORY OF MACROSOMIA IN INFANT IN PRIOR PREGNANCY, CURRENTLY PREGNANT IN FIRST TRIMESTER: Status: RESOLVED | Noted: 2022-06-04 | Resolved: 2023-07-06

## 2023-07-06 PROBLEM — O09.522 ELDERLY MULTIGRAVIDA, SECOND TRIMESTER: Status: RESOLVED | Noted: 2022-07-22 | Resolved: 2023-07-06

## 2023-07-06 PROBLEM — O09.529 ANTEPARTUM MULTIGRAVIDA OF ADVANCED MATERNAL AGE: Status: RESOLVED | Noted: 2022-06-04 | Resolved: 2023-07-06

## 2023-07-06 PROBLEM — F43.20 GRIEF REACTION: Status: RESOLVED | Noted: 2021-09-28 | Resolved: 2023-07-06

## 2023-07-06 PROBLEM — R53.82 CHRONIC FATIGUE: Status: RESOLVED | Noted: 2020-10-15 | Resolved: 2023-07-06

## 2023-07-06 PROBLEM — O43.199 MARGINAL INSERTION OF UMBILICAL CORD AFFECTING MANAGEMENT OF MOTHER: Status: RESOLVED | Noted: 2022-06-04 | Resolved: 2023-07-06

## 2023-07-06 PROCEDURE — 99214 OFFICE O/P EST MOD 30 MIN: CPT | Performed by: NURSE PRACTITIONER

## 2023-07-06 RX ORDER — VENLAFAXINE HYDROCHLORIDE 37.5 MG/1
37.5 CAPSULE, EXTENDED RELEASE ORAL
Qty: 30 CAPSULE | Refills: 1 | Status: SHIPPED | OUTPATIENT
Start: 2023-07-06

## 2023-07-06 NOTE — ASSESSMENT & PLAN NOTE
SARAH-7=9  Given venlafaxine was helpful previously will start there again. She is open to getting back into therapy so referral placed. F/U in 4 weeks. Call with worsening mood.

## 2023-07-06 NOTE — PROGRESS NOTES
Assessment/Plan:    SARAH (generalized anxiety disorder)  SARAH-7=9  Given venlafaxine was helpful previously will start there again. She is open to getting back into therapy so referral placed. F/U in 4 weeks. Call with worsening mood. Moderate episode of recurrent major depressive disorder (HCC)  PHQ-9=5  More anxious than depressed. Start venlafaxine. F/U in 4 weeks. Diagnoses and all orders for this visit:    SARAH (generalized anxiety disorder)  -     venlafaxine (EFFEXOR-XR) 37.5 mg 24 hr capsule; Take 1 capsule (37.5 mg total) by mouth daily with breakfast  -     Ambulatory Referral to [address] [place] [address]; Future    Moderate episode of recurrent major depressive disorder (HCC)  -     venlafaxine (EFFEXOR-XR) 37.5 mg 24 hr capsule; Take 1 capsule (37.5 mg total) by mouth daily with breakfast  -     Ambulatory Referral to [address] [place] [address]; Future          Subjective:      Patient ID: Farooq Beltre is a 40 y.o. female. Starting to feel anxious again. Worse in the morning. Bad anxiety previously and was treated. Stopped medication when she got pregnant. Anxiety was good during and after pregnancy. Describes waking up with pit in her stomach. Can't eat. Not as bad as previous but feels it creeping back. Feeling more irritable. Increasing fatigue. Finds it difficult to get herself up and out of bed. She does report once up she is functioning fine. Was in therapy but no longer. Does not feel she needs it again. Was on venlafaxine prior to pregnancy. Found this very effective. Was on lexapro prior to that and not as effective. Was also on buspar. Feeling somewhat depressed but nothing significant.        The following portions of the patient's history were reviewed and updated as appropriate: allergies, current medications, past family history, past medical history, past social history, past surgical history and problem list.    Review of Systems   Constitutional: Positive for appetite change and fatigue. Psychiatric/Behavioral: Positive for agitation and dysphoric mood. Negative for sleep disturbance and suicidal ideas. The patient is nervous/anxious. Objective:  Vitals:    07/06/23 0906   BP: 112/60   Pulse: 66   Temp: (!) 97 °F (36.1 °C)   SpO2: 99%      Physical Exam  Vitals reviewed. Constitutional:       Appearance: Normal appearance. Cardiovascular:      Rate and Rhythm: Normal rate and regular rhythm. Heart sounds: Normal heart sounds. No murmur heard. Pulmonary:      Effort: Pulmonary effort is normal.      Breath sounds: Normal breath sounds. Skin:     General: Skin is warm and dry. Neurological:      Mental Status: She is alert and oriented to person, place, and time. Psychiatric:         Mood and Affect: Mood normal.         Behavior: Behavior normal.         Thought Content:  Thought content normal.         Judgment: Judgment normal.

## 2023-08-31 ENCOUNTER — OFFICE VISIT (OUTPATIENT)
Dept: FAMILY MEDICINE CLINIC | Facility: HOSPITAL | Age: 38
End: 2023-08-31
Payer: COMMERCIAL

## 2023-08-31 VITALS
SYSTOLIC BLOOD PRESSURE: 126 MMHG | HEART RATE: 64 BPM | OXYGEN SATURATION: 98 % | BODY MASS INDEX: 25.8 KG/M2 | HEIGHT: 60 IN | WEIGHT: 131.4 LBS | DIASTOLIC BLOOD PRESSURE: 88 MMHG | TEMPERATURE: 98 F

## 2023-08-31 DIAGNOSIS — F41.1 GAD (GENERALIZED ANXIETY DISORDER): ICD-10-CM

## 2023-08-31 DIAGNOSIS — F33.1 MODERATE EPISODE OF RECURRENT MAJOR DEPRESSIVE DISORDER (HCC): Primary | ICD-10-CM

## 2023-08-31 PROCEDURE — 99214 OFFICE O/P EST MOD 30 MIN: CPT | Performed by: NURSE PRACTITIONER

## 2023-08-31 RX ORDER — VENLAFAXINE HYDROCHLORIDE 75 MG/1
75 CAPSULE, EXTENDED RELEASE ORAL
Qty: 30 CAPSULE | Refills: 1 | Status: SHIPPED | OUTPATIENT
Start: 2023-08-31

## 2023-08-31 NOTE — PROGRESS NOTES
Assessment/Plan: Moderate episode of recurrent major depressive disorder ([address])  PHQ-9=3  Feels she would benefit from increased dose of venlafaxine. Increase venlafaxine to 75 mg.   F/u in 4 weeks. Call with worsening mood. SARAH (generalized anxiety disorder)  SARAH-7=3  Increase venlafaxine to 75 mg. Previously on Buspar but I do not feel this is indicated at this time. Diagnoses and all orders for this visit:    Moderate episode of recurrent major depressive disorder (HCC)  -     venlafaxine (EFFEXOR-XR) 75 mg 24 hr capsule; Take 1 capsule (75 mg total) by mouth daily with breakfast    SARAH (generalized anxiety disorder)  -     venlafaxine (EFFEXOR-XR) 75 mg 24 hr capsule; Take 1 capsule (75 mg total) by mouth daily with breakfast          Subjective:      Patient ID: Henri Lacey is a 41 y.o. female. Slowly feeling better. Still with depressive symptoms vs anxiety symptoms. No panic attacks. Increased agitation and irritability-not worse then before she started venlafaxine. The following portions of the patient's history were reviewed and updated as appropriate: allergies, current medications, past family history, past medical history, past social history, past surgical history and problem list.    Review of Systems   Constitutional: Positive for fatigue. Negative for activity change, appetite change and unexpected weight change. Psychiatric/Behavioral: Positive for dysphoric mood. Negative for agitation, decreased concentration, sleep disturbance and suicidal ideas. The patient is nervous/anxious. Objective:  Vitals:    08/31/23 1053   BP: 126/88   Pulse: 64   Temp: 98 °F (36.7 °C)   SpO2: 98%      Physical Exam  Vitals reviewed. Constitutional:       Appearance: Normal appearance. Cardiovascular:      Rate and Rhythm: Normal rate and regular rhythm. Heart sounds: Normal heart sounds. No murmur heard.   Pulmonary:      Effort: Pulmonary effort is normal. Breath sounds: Normal breath sounds. Skin:     General: Skin is warm and dry. Neurological:      Mental Status: She is alert and oriented to person, place, and time. Psychiatric:         Mood and Affect: Mood normal.         Behavior: Behavior normal.         Thought Content:  Thought content normal.         Judgment: Judgment normal.

## 2023-08-31 NOTE — ASSESSMENT & PLAN NOTE
PHQ-9=3  Feels she would benefit from increased dose of venlafaxine. Increase venlafaxine to 75 mg.   F/u in 4 weeks. Call with worsening mood.

## 2023-08-31 NOTE — ASSESSMENT & PLAN NOTE
SARAH-7=3  Increase venlafaxine to 75 mg. Previously on Buspar but I do not feel this is indicated at this time.

## 2023-09-05 ENCOUNTER — TELEPHONE (OUTPATIENT)
Dept: ADMINISTRATIVE | Facility: OTHER | Age: 38
End: 2023-09-05

## 2023-09-05 NOTE — TELEPHONE ENCOUNTER
----- Message from [facility] sent at 8/31/2023  3:21 PM EDT -----  08/31/23 3:21 PM    Hello, our patient Tiffanie Gabriel has had Pap Smear (HPV) aka Cervical Cancer Screening completed/performed. Please assist in updating the patient chart by pulling a previous Electronic Medical Record (EMR) document. The previous EMR is [employer] UNDER ENCOUNTERS . The date of service is 6/15/2023 & 7/10/2023.     Thank you,  Luci Ortez  [address] [phone]

## 2023-09-05 NOTE — TELEPHONE ENCOUNTER
----- Message from [facility] sent at 8/31/2023  3:21 PM EDT -----  08/31/23 3:21 PM    Hello, our patient Karl Breen has had Pap Smear (HPV) aka Cervical Cancer Screening completed/performed. Please assist in updating the patient chart by pulling a previous Electronic Medical Record (EMR) document. The previous EMR is [employer] UNDER ENCOUNTERS . The date of service is 6/15/2023 & 7/10/2023.     Thank you,  Luci Ortez  [address] [phone]

## 2023-09-06 NOTE — TELEPHONE ENCOUNTER
Upon review of the In Basket request we were able to note that no further action is required. The patient chart is up to date as a result of a previous request.        Notes from 6/15/23 , Routine Gyn Visit ;states last Pap 10/20/20 ,. Pap already in the chart. Any additional questions or concerns should be emailed to the Practice Liaisons via the appropriate education email address, please do not reply via In Basket.     Thank you  Get Gastelum MA

## 2023-11-16 DIAGNOSIS — F41.1 GAD (GENERALIZED ANXIETY DISORDER): ICD-10-CM

## 2023-11-16 DIAGNOSIS — F33.1 MODERATE EPISODE OF RECURRENT MAJOR DEPRESSIVE DISORDER (HCC): ICD-10-CM

## 2023-11-16 RX ORDER — VENLAFAXINE HYDROCHLORIDE 75 MG/1
CAPSULE, EXTENDED RELEASE ORAL
Qty: 30 CAPSULE | Refills: 1 | Status: SHIPPED | OUTPATIENT
Start: 2023-11-16

## 2024-01-12 DIAGNOSIS — F41.1 GAD (GENERALIZED ANXIETY DISORDER): ICD-10-CM

## 2024-01-12 DIAGNOSIS — F33.1 MODERATE EPISODE OF RECURRENT MAJOR DEPRESSIVE DISORDER (HCC): ICD-10-CM

## 2024-01-14 DIAGNOSIS — F33.1 MODERATE EPISODE OF RECURRENT MAJOR DEPRESSIVE DISORDER (HCC): ICD-10-CM

## 2024-01-14 DIAGNOSIS — F41.1 GAD (GENERALIZED ANXIETY DISORDER): ICD-10-CM

## 2024-01-14 RX ORDER — VENLAFAXINE HYDROCHLORIDE 75 MG/1
CAPSULE, EXTENDED RELEASE ORAL
Qty: 30 CAPSULE | Refills: 0 | Status: SHIPPED | OUTPATIENT
Start: 2024-01-14 | End: 2024-01-15

## 2024-01-14 NOTE — TELEPHONE ENCOUNTER
I gave 1 month curtesy refill for venlafaxine. She was due for f/u in September and was not seen. No further refills until seen.

## 2024-01-15 RX ORDER — VENLAFAXINE HYDROCHLORIDE 75 MG/1
CAPSULE, EXTENDED RELEASE ORAL
Qty: 90 CAPSULE | Refills: 0 | Status: SHIPPED | OUTPATIENT
Start: 2024-01-15

## 2024-02-08 ENCOUNTER — OFFICE VISIT (OUTPATIENT)
Dept: FAMILY MEDICINE CLINIC | Facility: HOSPITAL | Age: 39
End: 2024-02-08
Payer: COMMERCIAL

## 2024-02-08 VITALS
SYSTOLIC BLOOD PRESSURE: 122 MMHG | TEMPERATURE: 97.3 F | OXYGEN SATURATION: 99 % | WEIGHT: 131 LBS | HEIGHT: 60 IN | HEART RATE: 62 BPM | DIASTOLIC BLOOD PRESSURE: 72 MMHG | BODY MASS INDEX: 25.72 KG/M2

## 2024-02-08 DIAGNOSIS — F41.1 GAD (GENERALIZED ANXIETY DISORDER): ICD-10-CM

## 2024-02-08 DIAGNOSIS — F33.1 MODERATE EPISODE OF RECURRENT MAJOR DEPRESSIVE DISORDER (HCC): Primary | ICD-10-CM

## 2024-02-08 PROCEDURE — 99214 OFFICE O/P EST MOD 30 MIN: CPT | Performed by: NURSE PRACTITIONER

## 2024-02-08 RX ORDER — VENLAFAXINE HYDROCHLORIDE 75 MG/1
75 CAPSULE, EXTENDED RELEASE ORAL DAILY
Qty: 90 CAPSULE | Refills: 1 | Status: SHIPPED | OUTPATIENT
Start: 2024-02-08

## 2024-02-08 NOTE — PROGRESS NOTES
Assessment/Plan:    Moderate episode of recurrent major depressive disorder (HCC)  PHQ-9=2  Mood is controlled.   She would like to stay on current venlafaxine dose.   Plan to f/u in 6 months.   Call with worsening mood.     SARAH (generalized anxiety disorder)  SARAH-7=2  Mood is controlled.   She would like to stay on current venlafaxine dose.   Plan to f/u in 6 months.   Call with worsening mood.        Diagnoses and all orders for this visit:    Moderate episode of recurrent major depressive disorder (HCC)  -     venlafaxine (EFFEXOR-XR) 75 mg 24 hr capsule; Take 1 capsule (75 mg total) by mouth daily    SARAH (generalized anxiety disorder)  -     venlafaxine (EFFEXOR-XR) 75 mg 24 hr capsule; Take 1 capsule (75 mg total) by mouth daily          Subjective:      Patient ID: Ally Freeman is a 38 y.o. female.    Recently restarted venlafaxine and back in August dose was increased to 75 mg. Initially had some dizziness and fatigue. This has improved. She feels venlafaxine helps with energy. Not wanting to sleep as much. Overall she is feeling well. Not missing doses. She has started back on magnesium and B vitamins.         The following portions of the patient's history were reviewed and updated as appropriate: allergies, current medications, past family history, past medical history, past social history, past surgical history and problem list.    Review of Systems   Constitutional:  Positive for fatigue. Negative for appetite change.   Psychiatric/Behavioral:  Positive for agitation and sleep disturbance. Negative for decreased concentration, dysphoric mood and suicidal ideas. The patient is nervous/anxious.          Objective:  Vitals:    02/08/24 1102   BP: 122/72   Pulse: 62   Temp: (!) 97.3 °F (36.3 °C)   SpO2: 99%      Physical Exam  Vitals reviewed.   Constitutional:       Appearance: Normal appearance.   Cardiovascular:      Rate and Rhythm: Normal rate and regular rhythm.      Heart sounds: Normal heart  sounds. No murmur heard.  Pulmonary:      Effort: Pulmonary effort is normal.      Breath sounds: Normal breath sounds.   Skin:     General: Skin is warm and dry.   Neurological:      Mental Status: She is alert and oriented to person, place, and time.   Psychiatric:         Mood and Affect: Mood normal.         Behavior: Behavior normal.         Thought Content: Thought content normal.         Judgment: Judgment normal.

## 2024-02-08 NOTE — ASSESSMENT & PLAN NOTE
PHQ-9=2  Mood is controlled.   She would like to stay on current venlafaxine dose.   Plan to f/u in 6 months.   Call with worsening mood.

## 2024-02-08 NOTE — ASSESSMENT & PLAN NOTE
SARAH-7=2  Mood is controlled.   She would like to stay on current venlafaxine dose.   Plan to f/u in 6 months.   Call with worsening mood.

## 2024-05-20 ENCOUNTER — NURSE TRIAGE (OUTPATIENT)
Age: 39
End: 2024-05-20

## 2024-05-20 NOTE — TELEPHONE ENCOUNTER
"Patient calling in with depression episode that started on Friday evening. Has not eaten more than a cracker and a few bites of soup along with some gatorade sips since Saturday. States this happened before in 2020 and she was bad for a few months. No appointmetns available today. Called office and was told if she calls back after 3 we can get her in for appointment tomorrow. Advised patient of the same and also recommended ER if she is not staying hydrated. Patient is going to try to get out of bed and eat something and drink. Denies any SI or plans. Please advise of any further recommendations.         Reason for Disposition   Depression is worsening (e.g.,sleeping poorly, less able to do activities of daily living)    Answer Assessment - Initial Assessment Questions  1. CONCERN: \"What happened that made you call today?\"      Depressed, and anxiety  2. DEPRESSION SYMPTOM SCREENING: \"How are you feeling overall?\" (e.g., decreased energy, increased sleeping or difficulty sleeping, difficulty concentrating, feelings of sadness, guilt, hopelessness, or worthlessness)      Sleeping, decreased appetite,   3. RISK OF HARM - SUICIDAL IDEATION:  \"Do you ever have thoughts of hurting or killing yourself?\"  (e.g., yes, no, no but preoccupation with thoughts about death)    - INTENT:  \"Do you have thoughts of hurting or killing yourself right NOW?\" (e.g., yes, no, N/A)    - PLAN: \"Do you have a specific plan for how you would do this?\" (e.g., gun, knife, overdose, no plan, N/A)      *No Answer*  4. RISK OF HARM - HOMICIDAL IDEATION:  \"Do you ever have thoughts of hurting or killing someone else?\"  (e.g., yes, no, no but preoccupation with thoughts about death)    - INTENT:  \"Do you have thoughts of hurting or killing someone right NOW?\" (e.g., yes, no, N/A)    - PLAN: \"Do you have a specific plan for how you would do this?\" (e.g., gun, knife, no plan, N/A)       denies  5. FUNCTIONAL IMPAIRMENT: \"How have things been going " "for you overall? Have you had more difficulty than usual doing your normal daily activities?\"  (e.g., better, same, worse; self-care, school, work, interactions)      Worse, unable to get out of bed most days  6. SUPPORT: \"Who is with you now?\" \"Who do you live with?\" \"Do you have family or friends who you can talk to?\"       unsure  7. THERAPIST: \"Do you have a counselor or therapist? Name?\"      denies  8. STRESSORS: \"Has there been any new stress or recent changes in your life?\"      denies  9. ALCOHOL USE OR SUBSTANCE USE (DRUG USE): \"Do you drink alcohol or use any illegal drugs?\"      denies  10. OTHER: \"Do you have any other physical symptoms right now?\" (e.g., fever)        denies  11. PREGNANCY: \"Is there any chance you are pregnant?\" \"When was your last menstrual period?\"        N.a    Protocols used: Depression-ADULT-[place]    "

## 2024-05-21 ENCOUNTER — OFFICE VISIT (OUTPATIENT)
Dept: FAMILY MEDICINE CLINIC | Facility: HOSPITAL | Age: 39
End: 2024-05-21
Payer: COMMERCIAL

## 2024-05-21 VITALS
SYSTOLIC BLOOD PRESSURE: 118 MMHG | HEIGHT: 60 IN | OXYGEN SATURATION: 99 % | HEART RATE: 82 BPM | DIASTOLIC BLOOD PRESSURE: 62 MMHG | TEMPERATURE: 97.8 F | BODY MASS INDEX: 25.13 KG/M2 | WEIGHT: 128 LBS

## 2024-05-21 DIAGNOSIS — F33.1 MODERATE EPISODE OF RECURRENT MAJOR DEPRESSIVE DISORDER (HCC): Primary | ICD-10-CM

## 2024-05-21 DIAGNOSIS — F41.1 GAD (GENERALIZED ANXIETY DISORDER): ICD-10-CM

## 2024-05-21 DIAGNOSIS — B34.9 VIRAL ILLNESS: ICD-10-CM

## 2024-05-21 PROCEDURE — 99213 OFFICE O/P EST LOW 20 MIN: CPT | Performed by: NURSE PRACTITIONER

## 2024-05-21 NOTE — ASSESSMENT & PLAN NOTE
PHQ-9=11  We discussed that likely physical symptoms r/t viral illness triggered depressive symptoms. Historically she correlates feeling ill to depression and anxiety.   I do not feel she needs dose increase.   I recommend she give this some time. If she continues to feel poorly then she should message me and will increase venlafaxine.

## 2024-05-21 NOTE — PROGRESS NOTES
Ambulatory Visit  Name: Ally Freeman      : 1985      MRN: [id number]  Encounter Provider: FLEX Boss  Encounter Date: 2024   Encounter department: [facility] CARE [address]     Assessment & Plan   1. Moderate episode of recurrent major depressive disorder (HCC)  Assessment & Plan:  PHQ-9=11  We discussed that likely physical symptoms r/t viral illness triggered depressive symptoms. Historically she correlates feeling ill to depression and anxiety.   I do not feel she needs dose increase.   I recommend she give this some time. If she continues to feel poorly then she should message me and will increase venlafaxine.   2. SARAH (generalized anxiety disorder)  Assessment & Plan:  SARAH-7=14  See above plan.   3. Viral illness  Comments:  I suspect her symptoms are likely viral and triggered her depression and anxiety.   She is starting to feel better so will monitor for now.      Depression Screening and Follow-up Plan: Patient's depression screening was positive with a PHQ-9 score of 11. Patient with underlying depression and was advised to continue current medications as prescribed.         History of Present Illness     Started to not feel well with decreased appetite the last 3-4 days. She had diarrhea and vomiting. Developed increased anxiety and increased crying. Increased sleeping. Feeling agitated. Having waves of heat. She does report missing venlafaxine dose prior to symptoms starting. Yesterday she did some grounding exercises, spent time outside reading, planted flowers and felt better. Today she feels better.       Review of Systems   Constitutional:  Positive for appetite change and fatigue.   Gastrointestinal:  Positive for diarrhea, nausea and vomiting.   Psychiatric/Behavioral:  Positive for agitation, dysphoric mood and sleep disturbance. The patient is nervous/anxious.      Past Medical History:   Diagnosis Date    Antepartum multigravida of advanced  maternal age 06/04/2022    NIPT through her office    Anxiety     Cervical dysplasia, mild     Current mild episode of major depressive disorder without prior episode ([facility]) 05/12/2020    Depression     History of macrosomia in infant in prior pregnancy, currently pregnant in first trimester 06/04/2022    X 4 with largest 11lb 11 oz    Iron deficiency anemia      Past Surgical History:   Procedure Laterality Date    BREAST SURGERY Right     Lumpectomy     COLPOSCOPY       Family History   Problem Relation Age of Onset    Lung cancer Family     Ovarian cancer Family     Depression Mother     Alcohol abuse Father         Has not drank in 30yrs but alcoholism runs in the family, my grandfather uncle grammy etc    Drug abuse Father     Dementia Paternal Grandmother     Bipolar disorder Paternal Uncle     Drug abuse Paternal Uncle     Drug abuse Cousin      Social History     Tobacco Use    Smoking status: Former     Current packs/day: 1.50     Average packs/day: 1.5 packs/day for 15.0 years (22.5 ttl pk-yrs)     Types: Cigarettes    Smokeless tobacco: Never   Vaping Use    Vaping status: Never Used   Substance and Sexual Activity    Alcohol use: Not Currently     Comment: Once in awhile but causes anxiety    Drug use: No    Sexual activity: Yes     Partners: Male     Birth control/protection: None     Current Outpatient Medications on File Prior to Visit   Medication Sig    venlafaxine (EFFEXOR-XR) 75 mg 24 hr capsule Take 1 capsule (75 mg total) by mouth daily     No Known Allergies  Immunization History   Administered Date(s) Administered    DTP 02/28/1991, 02/28/1991, 04/02/1991, 04/02/1991, 05/09/1991, 05/09/1991, 08/13/1991, 08/13/1991    Hep B, Adolescent or Pediatric 09/20/2005, 09/20/2005, 10/25/2005, 10/25/2005, 03/07/2006, 03/07/2006    MMR 02/07/1991, 02/07/1991, 06/08/1998, 06/08/1998    OPV 02/07/1991, 02/07/1991, 05/09/1991, 05/09/1991, 08/20/1991, 08/20/1991    Td (adult), adsorbed 09/20/2005, 09/20/2005     Tdap 10/15/2020, 10/15/2020    Tuberculin Skin Test 08/13/1991, 06/08/1998    Tuberculin Skin Test-PPD Intradermal 09/20/2005, 11/10/2015, 11/29/2016, 09/12/2017, 09/16/2017, 08/22/2022, 08/22/2022, 08/29/2022, 08/29/2022     Objective     /62 (BP Location: Left arm, Patient Position: Sitting, Cuff Size: Standard)   Pulse 82   Temp 97.8 °F (36.6 °C) (Tympanic)   Ht 5' (1.524 m)   Wt 58.1 kg (128 lb)   SpO2 99%   BMI 25.00 kg/m²     Physical Exam  Vitals reviewed.   Constitutional:       General: She is not in acute distress.     Appearance: Normal appearance. She is not ill-appearing.   Cardiovascular:      Rate and Rhythm: Normal rate and regular rhythm.      Heart sounds: Normal heart sounds. No murmur heard.  Pulmonary:      Effort: Pulmonary effort is normal.      Breath sounds: Normal breath sounds.   Skin:     General: Skin is warm and dry.   Neurological:      Mental Status: She is alert and oriented to person, place, and time.   Psychiatric:         Mood and Affect: Mood normal.         Behavior: Behavior normal.         Thought Content: Thought content normal.         Judgment: Judgment normal.       Administrative Statements

## 2024-05-31 ENCOUNTER — TELEPHONE (OUTPATIENT)
Age: 39
End: 2024-05-31

## 2024-05-31 NOTE — TELEPHONE ENCOUNTER
Patient called because she has a broken tooth that she believes is infected. She is having jaw pain and her glands are slightly swollen. She stated her dentist no longer accepts her insurance and she is in the process of setting up an appointment with a new one, however in the mean time she is asking if a prescription for an antibiotic can be sent to her pharmacy. Please call patient and advise.

## 2024-05-31 NOTE — TELEPHONE ENCOUNTER
PT call again due to not hearing back from the doctor about the antibiotic. I was informing the PT the message was forward to the doctor as soon as the doctor becoming available she will reach out to you. In the middle of me informing the PT the call dropped. Thank you

## 2024-06-04 NOTE — TELEPHONE ENCOUNTER
Attempted to call pt again today, no answer, detailed message was left if further assistance is needed to call our office.

## 2024-06-06 ENCOUNTER — TELEPHONE (OUTPATIENT)
Age: 39
End: 2024-06-06

## 2024-06-06 NOTE — TELEPHONE ENCOUNTER
PT call to speak with Rosario Noguera the NP about the antibiotic she was on and her anxiety attack also. PT would like to touch base with the Rosario to provide more details on the to matter. Please feel free to contact the PT. Thank you

## 2024-06-06 NOTE — TELEPHONE ENCOUNTER
Pt can message me on My Chart but advise appointment if there is a lot she would like to discuss. I am in with pt's all day.

## 2024-08-30 ENCOUNTER — TELEPHONE (OUTPATIENT)
Age: 39
End: 2024-08-30

## 2024-08-30 NOTE — TELEPHONE ENCOUNTER
Please call.   Monitor sytmpoms for infection. Usually Skin such as cellulitis or  respiratory illness  . No other recommendations for mrsa exposure.  Hand washing, shower, change of clothes

## 2024-08-30 NOTE — TELEPHONE ENCOUNTER
Patient calling states was recently exposed to MRSA by home care client she was changing his wounds and taking care of him client is currently in hospital. Pt calling requesting further recommendations and advise on what she should do as she does also work in  and has children at home.      Please review and advise.  Thank you

## 2024-09-23 NOTE — ASSESSMENT & PLAN NOTE
Better controlled  Long discussion with pt regarding anxiety, hormones, sleep etc    At this point I do not think further blood work would be beneficial    No change to current regimen  Continue with therapy  F/U in 3 months  Colonoscopy tomorrow @ 0800     Has not had bowel movement yet  Noon- took 4 tablets dulcolax  545 started prep 16 oz  Drank 2 -16 oz of water within .5 hour  Finished at 6:15/6:30    Pt inquired about when to stop drinking liquids.    Triager read from colonoscopy instructions in chart:    Ok to continue clear liquids up until 4 hours prior to the Endoscopy procedure. Pt VU.     Pt was concerned because she still as not had a bowel movement 1 hr after bowel prep. Advised pt that it may just take longer depending on individual patient for bowel movements to begin. Pt vu. Advised pt to call back with any questions or concerns.    Advised pt per protocol that home care is appropriate at this time. Pt vu.     Reason for Disposition   Bowel prep for colonoscopy, questions about    Additional Information   Negative: Shock suspected (e.g., cold/pale/clammy skin, too weak to stand, low BP, rapid pulse)   Negative: Difficult to awaken or acting confused (e.g., disoriented, slurred speech)   Negative: Passed out (e.g., fainted, lost consciousness, blacked out and was not responding)   Negative: Sounds like a life-threatening emergency to the triager   Negative: SEVERE abdomen pain (e.g., excruciating)   Negative: SEVERE rectal bleeding (large blood clots; on and off, or constant bleeding)   Negative: SEVERE dizziness (e.g., unable to stand, requires support to walk, feels like passing out now)   Negative: SEVERE vomiting (e.g., 6 or more times/day)   Negative: [1] MODERATE rectal bleeding (small blood clots, passing blood without stool, or toilet water turns red) AND [2] more than once   Negative: [1] MILD-MODERATE abdomen pain AND [2] constant AND [3] present > 2 hours   Negative: [1] Drinking very little AND [2] dehydration suspected (e.g., no urine > 12 hours, very dry mouth, very lightheaded)   Negative: Patient sounds very sick or weak to the triager   Negative: Fever > 100.4 F (38.0 C)   Negative: [1] Abdominal  bloating, cramping, nausea, or vomiting while drinking bowel prep AND [2] CANNOT finish bowel prep AND [3] has tried recommended Care Advice   Negative: [1] Caller has URGENT question or concern AND [2] triager unable to answer question   Negative: [1] MILD-MODERATE abdomen pain (e.g., does not interfere with normal activities) AND [2] pain comes and goes (cramps) [3] lasting > 48 hours   Negative: [1] MILD to MODERATE vomiting (e.g., 1 to 5 times a day) AND [2] lasting > 24 hours   Negative: Any rectal bleeding occurring > 24 hours after colonoscopy   Negative: [1] Caller has NON-URGENT question AND [2] triager unable to answer question   Negative: Abdominal cramping and bloating after colonoscopy, questions about   Negative: Rectal bleeding (slight; streaks or less than 1 teaspoon or 5 ml) after colonoscopy, questions about   Negative: Passing gas (flatus) after colonoscopy, questions about   Negative: Abdominal bloating, cramping, nausea, or vomiting while drinking bowel prep, questions about    Protocols used: Colonoscopy Symptoms and [id number]

## 2024-10-05 DIAGNOSIS — F33.1 MODERATE EPISODE OF RECURRENT MAJOR DEPRESSIVE DISORDER (HCC): ICD-10-CM

## 2024-10-05 DIAGNOSIS — F41.1 GAD (GENERALIZED ANXIETY DISORDER): ICD-10-CM

## 2024-10-06 RX ORDER — VENLAFAXINE HYDROCHLORIDE 75 MG/1
75 CAPSULE, EXTENDED RELEASE ORAL DAILY
Qty: 90 CAPSULE | Refills: 1 | Status: SHIPPED | OUTPATIENT
Start: 2024-10-06

## 2025-01-02 ENCOUNTER — TELEPHONE (OUTPATIENT)
Age: 40
End: 2025-01-02

## 2025-01-02 NOTE — TELEPHONE ENCOUNTER
Patient's oldest daughter was diagnosed with Flu on Monday 12/30 and patient is sick as well. Patient is wondering if Rosario Noguera can give her excuse letter from work for today. Please reach out to the patient with any questions or let her know when the letter is in. She can  paper copy. If she can get he letter by today or tomorrow it would be great. Also please let her know if Rosario can not give the letter.    Thank you!!

## 2025-01-03 NOTE — TELEPHONE ENCOUNTER
Patient called in regards to excuse for work.  Informed waiting for provider.  Please contact if written.

## 2025-02-07 ENCOUNTER — OFFICE VISIT (OUTPATIENT)
Dept: FAMILY MEDICINE CLINIC | Facility: HOSPITAL | Age: 40
End: 2025-02-07
Payer: COMMERCIAL

## 2025-02-07 VITALS
DIASTOLIC BLOOD PRESSURE: 78 MMHG | BODY MASS INDEX: 24.9 KG/M2 | SYSTOLIC BLOOD PRESSURE: 114 MMHG | TEMPERATURE: 98.1 F | HEART RATE: 88 BPM | HEIGHT: 60 IN | OXYGEN SATURATION: 98 % | WEIGHT: 126.8 LBS

## 2025-02-07 DIAGNOSIS — Z13.6 SCREENING FOR CARDIOVASCULAR CONDITION: ICD-10-CM

## 2025-02-07 DIAGNOSIS — Z00.00 ANNUAL PHYSICAL EXAM: Primary | ICD-10-CM

## 2025-02-07 PROCEDURE — 99395 PREV VISIT EST AGE 18-39: CPT

## 2025-02-07 NOTE — PROGRESS NOTES
Adult Annual Physical  Name: Ally Freeman      : 1985      MRN: [id number]  Encounter Provider: Jil Genao DO  Encounter Date: 2025   Encounter department: [facility] [address]    Assessment & Plan  Annual physical exam         Screening for cardiovascular condition    Orders:    Lipid panel; Future    Hemoglobin A1C; Future    Immunizations and preventive care screenings were discussed with patient today. Appropriate education was printed on patient's after visit summary.    Counseling:  Dental Health: discussed importance of regular tooth brushing, flossing, and dental visits.         History of Present Illness     Adult Annual Physical:  Patient presents for annual physical. No concrns  Doesn't need refills.     Diet and Physical Activity:  - Diet/Nutrition: well balanced diet.  - Exercise: walking.    Depression Screening:    - PHQ-9 Score: 1    General Health:  - Sleep: 7-8 hours of sleep on average.  - Hearing: normal hearing bilateral ears.  - Vision: wears contacts.  - Dental: regular dental visits.    /GYN Health:  - Follows with GYN: yes.   - Menopause: premenopausal.   - Last menstrual cycle: 2025.   - History of STDs: yes  - Contraception:. h/o HPV, currently stable and following with GYN        Preventative Screening  Last pap , follows with GYN  FDLMP 2 weeks ago, occur once a month, lasting 5 days  Contraception: denies  STD screeningdeclines  Denies Family history of breast cancer, colon cancer, prostate cancer  Labs: Hgb A1C ordered, FLP ordered    Immunizations  Last TDAP   Last flu declines      Wt Readings from Last 3 Encounters:   25 57.5 kg (126 lb 12.8 oz)   24 58.1 kg (128 lb)   24 59.4 kg (131 lb)       PHQ-2/9 Depression Screening    Little interest or pleasure in doing things: 0 - not at all  Feeling down, depressed, or hopeless: 0 - not at all  Trouble falling or staying asleep, or sleeping too much:  0 - not at all  Feeling tired or having little energy: 1 - several days  Poor appetite or overeatin - not at all  Feeling bad about yourself - or that you are a failure or have let yourself or your family down: 0 - not at all  Trouble concentrating on things, such as reading the newspaper or watching television: 0 - not at all  Moving or speaking so slowly that other people could have noticed. Or the opposite - being so fidgety or restless that you have been moving around a lot more than usual: 0 - not at all  Thoughts that you would be better off dead, or of hurting yourself in some way: 0 - not at all             Family History  discussed    Allergies  denies    Social History  Tobacco - denies, quit 7 years ago, used to smoke 2 cigarettes/day  Alcohol - 2x/mo  Illicit Drugs - denies        Review of Systems   Respiratory:  Negative for shortness of breath.    Cardiovascular:  Negative for chest pain.   Gastrointestinal:  Negative for abdominal pain, nausea and vomiting.   Skin:  Negative for rash.   Allergic/Immunologic: Negative for environmental allergies and food allergies.   Psychiatric/Behavioral:  Negative for sleep disturbance.      Current Outpatient Medications on File Prior to Visit   Medication Sig Dispense Refill    venlafaxine (EFFEXOR-XR) 75 mg 24 hr capsule TAKE 1 CAPSULE BY MOUTH EVERY DAY 90 capsule 1     No current facility-administered medications on file prior to visit.      Social History     Tobacco Use    Smoking status: Former     Current packs/day: 1.50     Average packs/day: 1.5 packs/day for 15.0 years (22.5 ttl pk-yrs)     Types: Cigarettes    Smokeless tobacco: Never   Vaping Use    Vaping status: Never Used   Substance and Sexual Activity    Alcohol use: Not Currently     Comment: Once in awhile but causes anxiety    Drug use: No    Sexual activity: Yes     Partners: Male     Birth control/protection: None       Objective   /78   Pulse 88   Temp 98.1 °F (36.7 °C)  (Tympanic)   Ht 5' (1.524 m)   Wt 57.5 kg (126 lb 12.8 oz)   SpO2 98%   BMI 24.76 kg/m²     Physical Exam  Vitals reviewed.   Constitutional:       General: She is not in acute distress.     Appearance: Normal appearance. She is well-developed and normal weight. She is not ill-appearing.   HENT:      Head: Normocephalic and atraumatic.      Right Ear: Tympanic membrane, ear canal and external ear normal. There is no impacted cerumen.      Left Ear: Tympanic membrane, ear canal and external ear normal. There is no impacted cerumen.      Mouth/Throat:      Mouth: Mucous membranes are moist.   Eyes:      Conjunctiva/sclera: Conjunctivae normal.   Cardiovascular:      Rate and Rhythm: Normal rate and regular rhythm.      Heart sounds: Normal heart sounds.   Pulmonary:      Effort: Pulmonary effort is normal.      Breath sounds: Normal breath sounds.   Musculoskeletal:         General: Normal range of motion.   Skin:     General: Skin is warm and dry.   Neurological:      Mental Status: She is alert.      Gait: Gait normal.   Psychiatric:         Mood and Affect: Mood normal.         Behavior: Behavior normal.         Jil Genao,   Family Medicine

## 2025-03-29 ENCOUNTER — OFFICE VISIT (OUTPATIENT)
Dept: URGENT CARE | Facility: CLINIC | Age: 40
End: 2025-03-29
Payer: COMMERCIAL

## 2025-03-29 VITALS
SYSTOLIC BLOOD PRESSURE: 126 MMHG | OXYGEN SATURATION: 96 % | TEMPERATURE: 97.9 F | RESPIRATION RATE: 18 BRPM | HEART RATE: 72 BPM | DIASTOLIC BLOOD PRESSURE: 83 MMHG

## 2025-03-29 DIAGNOSIS — K04.7 TOOTH INFECTION: Primary | ICD-10-CM

## 2025-03-29 PROCEDURE — 99202 OFFICE O/P NEW SF 15 MIN: CPT

## 2025-03-29 RX ORDER — AMOXICILLIN 500 MG/1
500 CAPSULE ORAL EVERY 12 HOURS SCHEDULED
Qty: 20 CAPSULE | Refills: 0 | Status: SHIPPED | OUTPATIENT
Start: 2025-03-29 | End: 2025-04-08

## 2025-03-29 NOTE — PATIENT INSTRUCTIONS
Patient Education     Tooth Abscess ED   General Information   You came to the Emergency Department (ED) for a tooth abscess. An abscess is a collection of pus from an infection. You may have had an injury to a tooth, unhealthy gums, or tooth decay that led to your abscess. You may need a root canal or to have your tooth pulled because of an abscess. You will need antibiotics to treat the infection. It is important to take all of your antibiotics even if you start to feel better.  What care is needed at home?   Call your regular dentist to let them know you were in the ED. Make a follow-up appointment with a dentist if you were told to.  Avoid very cold or very hot food and drinks. These can make pain worse.  If you smoke, try to quit. Your doctor or nurse can help.  You may want to take medicines like ibuprofen, naproxen, or acetaminophen to help with pain.  Brush your teeth at least 2 times a day. Use toothpaste with fluoride.  Use dental floss to clean between your teeth every day.  When do I need to get emergency help?   Return to the ED if:   You have a fever of 100.4°F (38°C) or higher or chills.  You have swelling or pain in your neck or throat.  You are not able to open your mouth or eat.  You have trouble breathing.  When do I need to call the doctor?   You have very bad pain that is not helped by pain medicines.  You have swelling in your gums or face.  You have discharge around the tooth.  You have a bad taste in your mouth.  You have lots of bleeding from the gums.  You have more pain after having a tooth pulled.  You have new or worsening symptoms.  Last Reviewed Date   2021-04-26  Consumer Information Use and Disclaimer   This generalized information is a limited summary of diagnosis, treatment, and/or medication information. It is not meant to be comprehensive and should be used as a tool to help the user understand and/or assess potential diagnostic and treatment options. It does NOT include all  information about conditions, treatments, medications, side effects, or risks that may apply to a specific patient. It is not intended to be medical advice or a substitute for the medical advice, diagnosis, or treatment of a health care provider based on the health care provider's examination and assessment of a patient’s specific and unique circumstances. Patients must speak with a health care provider for complete information about their health, medical questions, and treatment options, including any risks or benefits regarding use of medications. This information does not endorse any treatments or medications as safe, effective, or approved for treating a specific patient. [employer] and its affiliates disclaim any warranty or liability relating to this information or the use thereof. The use of this information is governed by the Terms of Use, available at https://www.woltersNekteduwer.com/en/know/clinical-effectiveness-terms   Copyright   Copyright © 2024 [employer], [employer] and its affiliates and/or licensors. All rights reserved.

## 2025-03-29 NOTE — PROGRESS NOTES
[facility]        NAME: Ally Freeman is a 39 y.o. female  : 1985    MRN: [id number]  DATE: 2025  TIME: 12:04 PM    Assessment and Plan   Tooth infection [K04.7]  1. Tooth infection  amoxicillin (AMOXIL) 500 mg capsule            Patient Instructions   Eat yogurt with live and active cultures and/or take a probiotic at least 3 hours before or after antibiotic dose. Monitor stool for diarrhea and/or blood. If this occurs, contact primary care doctor ASAP.  Discussed to follow up with dentist immediately.  Patient Education     Tooth Abscess ED   General Information   You came to the Emergency Department (ED) for a tooth abscess. An abscess is a collection of pus from an infection. You may have had an injury to a tooth, unhealthy gums, or tooth decay that led to your abscess. You may need a root canal or to have your tooth pulled because of an abscess. You will need antibiotics to treat the infection. It is important to take all of your antibiotics even if you start to feel better.  What care is needed at home?   Call your regular dentist to let them know you were in the ED. Make a follow-up appointment with a dentist if you were told to.  Avoid very cold or very hot food and drinks. These can make pain worse.  If you smoke, try to quit. Your doctor or nurse can help.  You may want to take medicines like ibuprofen, naproxen, or acetaminophen to help with pain.  Brush your teeth at least 2 times a day. Use toothpaste with fluoride.  Use dental floss to clean between your teeth every day.  When do I need to get emergency help?   Return to the ED if:   You have a fever of 100.4°F (38°C) or higher or chills.  You have swelling or pain in your neck or throat.  You are not able to open your mouth or eat.  You have trouble breathing.  When do I need to call the doctor?   You have very bad pain that is not helped by pain medicines.  You have swelling in your gums or face.  You have discharge  around the tooth.  You have a bad taste in your mouth.  You have lots of bleeding from the gums.  You have more pain after having a tooth pulled.  You have new or worsening symptoms.  Last Reviewed Date   2021-04-26  Consumer Information Use and Disclaimer   This generalized information is a limited summary of diagnosis, treatment, and/or medication information. It is not meant to be comprehensive and should be used as a tool to help the user understand and/or assess potential diagnostic and treatment options. It does NOT include all information about conditions, treatments, medications, side effects, or risks that may apply to a specific patient. It is not intended to be medical advice or a substitute for the medical advice, diagnosis, or treatment of a health care provider based on the health care provider's examination and assessment of a patient’s specific and unique circumstances. Patients must speak with a health care provider for complete information about their health, medical questions, and treatment options, including any risks or benefits regarding use of medications. This information does not endorse any treatments or medications as safe, effective, or approved for treating a specific patient. Up[employer], [employer] and its affiliates disclaim any warranty or liability relating to this information or the use thereof. The use of this information is governed by the Terms of Use, available at https://www.woltersParkit Enterpriseuwer.com/en/know/clinical-effectiveness-terms   Copyright   Copyright © 2024 [employer], Inc. and its affiliates and/or licensors. All rights reserved.    Follow up with PCP in 3-5 days.  Proceed to  ER if symptoms worsen.    If tests have been performed at [facility], our office will contact you with results if changes need to be made to the care plan discussed with you at the visit.  You can review your full results on [facility] MyChart.    Chief Complaint     Chief Complaint   Patient presents with    Left  Lower Toothache     Pt reports left lower toothache that started yesterday. Pt reports she is in the process of following up with dentist (previous dentist office closed).          History of Present Illness       Ally Freeman is a 39 yr old female with no significant past medical history who presents to the urgent care for tooth pain. She states that she has had this before and that she was last at the dentist two years ago. She states that she had multiple tooth removed and she states that she has been on multiple rounds of antibiotics. She states that she had a piece of food stuck on the left side and she states that she is doing salt water gargles and she says that placing her head upwards helps. She says that the left bottom and upper side is hurting and that she is having shooting pain in the jaw and ear. She says that she is having swelling on the left hand before. She says that she does not have a dentist appointment but [facility] and she plans on going soon. She is doing motrin as well which is helping with the pain. She says that she is having a sensitivity for her gums and tooth.        Review of Systems   Review of Systems   Constitutional:  Negative for chills and fever.   HENT:  Negative for ear pain and sore throat.         Tooth pain   Eyes:  Negative for pain and visual disturbance.   Respiratory:  Negative for cough and shortness of breath.    Cardiovascular:  Negative for chest pain and palpitations.   Gastrointestinal:  Negative for abdominal pain and vomiting.   Genitourinary:  Negative for dysuria and hematuria.   Musculoskeletal:  Negative for arthralgias and back pain.   Skin:  Negative for color change and rash.   Neurological:  Negative for seizures and syncope.   All other systems reviewed and are negative.        Current Medications       Current Outpatient Medications:     amoxicillin (AMOXIL) 500 mg capsule, Take 1 capsule (500 mg total) by mouth every 12 (twelve)  hours for 10 days, Disp: 20 capsule, Rfl: 0    venlafaxine (EFFEXOR-XR) 75 mg 24 hr capsule, TAKE 1 CAPSULE BY MOUTH EVERY DAY, Disp: 90 capsule, Rfl: 1    Current Allergies     Allergies as of 03/29/2025    (No Known Allergies)            The following portions of the patient's history were reviewed and updated as appropriate: allergies, current medications, past family history, past medical history, past social history, past surgical history and problem list.     Past Medical History:   Diagnosis Date    Antepartum multigravida of advanced maternal age 06/04/2022    NIPT through her office    Anxiety     Cervical dysplasia, mild     Current mild episode of major depressive disorder without prior episode (HCC) 05/12/2020    Depression     History of macrosomia in infant in prior pregnancy, currently pregnant in first trimester 06/04/2022    X 4 with largest 11lb 11 oz    Iron deficiency anemia        Past Surgical History:   Procedure Laterality Date    BREAST SURGERY Right     Lumpectomy     COLPOSCOPY         Family History   Problem Relation Age of Onset    Lung cancer Family     Ovarian cancer Family     Depression Mother     Alcohol abuse Father         Has not drank in 30yrs but alcoholism runs in the family, my grandfather uncle grammy etc    Drug abuse Father     Dementia Paternal Grandmother     Bipolar disorder Paternal Uncle     Drug abuse Paternal Uncle     Drug abuse Cousin          Medications have been verified.        Objective   /83   Pulse 72   Temp 97.9 °F (36.6 °C)   Resp 18   LMP 03/24/2025 (Exact Date)   SpO2 96%   Patient's last menstrual period was 03/24/2025 (exact date).       Physical Exam     Physical Exam  Constitutional:       General: She is not in acute distress.     Appearance: Normal appearance. She is normal weight. She is not toxic-appearing.   HENT:      Head: Normocephalic and atraumatic.      Right Ear: Tympanic membrane, ear canal and external ear normal. There is  no impacted cerumen.      Left Ear: Tympanic membrane, ear canal and external ear normal. There is no impacted cerumen.      Nose: No congestion or rhinorrhea.      Mouth/Throat:      Mouth: Mucous membranes are moist. No injury, lacerations, oral lesions or angioedema.      Dentition: Does not have dentures. Gingival swelling, dental caries and dental abscesses present. No dental tenderness or gum lesions.      Tongue: No lesions. Tongue does not deviate from midline.      Palate: No mass and lesions.      Pharynx: Oropharynx is clear. Uvula midline. No pharyngeal swelling, oropharyngeal exudate or posterior oropharyngeal erythema.      Tonsils: No tonsillar exudate.        Comments: Both teeth in the back lower left jawline, both teeth were broken and there was erythema in the back with significant decay  Eyes:      General: No scleral icterus.        Right eye: No discharge.         Left eye: No discharge.      Extraocular Movements: Extraocular movements intact.      Conjunctiva/sclera: Conjunctivae normal.      Pupils: Pupils are equal, round, and reactive to light.   Neck:      Vascular: No carotid bruit.   Cardiovascular:      Rate and Rhythm: Normal rate and regular rhythm.      Pulses: Normal pulses.      Heart sounds: Normal heart sounds. No murmur heard.     No friction rub. No gallop.   Pulmonary:      Effort: Pulmonary effort is normal. No respiratory distress.      Breath sounds: Normal breath sounds. No stridor. No wheezing, rhonchi or rales.   Chest:      Chest wall: No tenderness.   Musculoskeletal:      Cervical back: Normal range of motion and neck supple. No rigidity or tenderness.   Lymphadenopathy:      Cervical: No cervical adenopathy.   Neurological:      Mental Status: She is alert.

## 2025-04-05 ENCOUNTER — OFFICE VISIT (OUTPATIENT)
Dept: URGENT CARE | Facility: CLINIC | Age: 40
End: 2025-04-05
Payer: COMMERCIAL

## 2025-04-05 VITALS
DIASTOLIC BLOOD PRESSURE: 85 MMHG | HEART RATE: 58 BPM | SYSTOLIC BLOOD PRESSURE: 155 MMHG | TEMPERATURE: 97.6 F | OXYGEN SATURATION: 98 % | RESPIRATION RATE: 18 BRPM

## 2025-04-05 DIAGNOSIS — K04.7 TOOTH INFECTION: Primary | ICD-10-CM

## 2025-04-05 DIAGNOSIS — R68.84 JAW PAIN: ICD-10-CM

## 2025-04-05 PROBLEM — L30.9 DERMATITIS: Status: ACTIVE | Noted: 2025-04-05

## 2025-04-05 PROCEDURE — 99213 OFFICE O/P EST LOW 20 MIN: CPT | Performed by: FAMILY MEDICINE

## 2025-04-05 RX ORDER — PREDNISONE 20 MG/1
40 TABLET ORAL DAILY
Qty: 10 TABLET | Refills: 0 | Status: SHIPPED | OUTPATIENT
Start: 2025-04-05 | End: 2025-04-10

## 2025-04-05 NOTE — PROGRESS NOTES
[facility]        NAME: Ally Freeman is a 39 y.o. female  : 1985    MRN: [id number]  DATE: 2025  TIME: 5:12 PM    Assessment and Plan   Tooth infection [K04.7]  1. Tooth infection  amoxicillin-clavulanate (AUGMENTIN) 875-125 mg per tablet    predniSONE 20 mg tablet      2. Jaw pain              Patient Instructions       Follow up with PCP in 3-5 days.  Proceed to  ER if symptoms worsen.    If tests have been performed at [facility], our office will contact you with results if changes need to be made to the care plan discussed with you at the visit.  You can review your full results on [facility].    Chief Complaint     Chief Complaint   Patient presents with    Dental Pain     Pt reports tooth pain has worsened in past week since initial visit to clinic.          History of Present Illness       39-year-old female presenting with recurrent left-sided jaw pain and tooth pain.  She reports small relief with using amoxicillin however her symptoms have since returned.    Dental Pain         Review of Systems   Review of Systems   Constitutional: Negative.    HENT:  Positive for dental problem.    Eyes: Negative.    Respiratory: Negative.     Cardiovascular: Negative.    Gastrointestinal: Negative.    Genitourinary: Negative.    Musculoskeletal: Negative.    Skin:  Positive for rash.   Allergic/Immunologic: Negative.    Neurological: Negative.    Hematological: Negative.    Psychiatric/Behavioral: Negative.           Current Medications       Current Outpatient Medications:     amoxicillin-clavulanate (AUGMENTIN) 875-125 mg per tablet, Take 1 tablet by mouth every 12 (twelve) hours for 7 days, Disp: 14 tablet, Rfl: 0    predniSONE 20 mg tablet, Take 2 tablets (40 mg total) by mouth daily for 5 days, Disp: 10 tablet, Rfl: 0    amoxicillin (AMOXIL) 500 mg capsule, Take 1 capsule (500 mg total) by mouth every 12 (twelve) hours for 10 days, Disp: 20 capsule, Rfl: 0    venlafaxine  (EFFEXOR-XR) 75 mg 24 hr capsule, TAKE 1 CAPSULE BY MOUTH EVERY DAY, Disp: 90 capsule, Rfl: 1    Current Allergies     Allergies as of 04/05/2025    (No Known Allergies)            The following portions of the patient's history were reviewed and updated as appropriate: allergies, current medications, past family history, past medical history, past social history, past surgical history and problem list.     Past Medical History:   Diagnosis Date    Antepartum multigravida of advanced maternal age 06/04/2022    NIPT through her office    Anxiety     Cervical dysplasia, mild     Current mild episode of major depressive disorder without prior episode (HCC) 05/12/2020    Depression     History of macrosomia in infant in prior pregnancy, currently pregnant in first trimester 06/04/2022    X 4 with largest 11lb 11 oz    Iron deficiency anemia        Past Surgical History:   Procedure Laterality Date    BREAST SURGERY Right     Lumpectomy     COLPOSCOPY         Family History   Problem Relation Age of Onset    Lung cancer Family     Ovarian cancer Family     Depression Mother     Alcohol abuse Father         Has not drank in 30yrs but alcoholism runs in the family, my grandfather uncle grammy etc    Drug abuse Father     Dementia Paternal Grandmother     Bipolar disorder Paternal Uncle     Drug abuse Paternal Uncle     Drug abuse Cousin          Medications have been verified.        Objective   /85   Pulse 58   Temp 97.6 °F (36.4 °C)   Resp 18   LMP 03/24/2025 (Exact Date)   SpO2 98%   Patient's last menstrual period was 03/24/2025 (exact date).       Physical Exam     Physical Exam  Vitals and nursing note reviewed.   Constitutional:       Appearance: She is well-developed.   HENT:      Head: Normocephalic.      Nose: Nose normal.      Mouth/Throat:      Dentition: Dental tenderness, gingival swelling and dental abscesses present.     Eyes:      Pupils: Pupils are equal, round, and reactive to light.    Cardiovascular:      Rate and Rhythm: Normal rate and regular rhythm.   Pulmonary:      Effort: Pulmonary effort is normal.   Abdominal:      General: Abdomen is flat.   Musculoskeletal:         General: Normal range of motion.      Cervical back: Normal range of motion.   Skin:     General: Skin is warm and dry.   Neurological:      Mental Status: She is alert and oriented to person, place, and time.

## 2025-04-11 DIAGNOSIS — F41.1 GAD (GENERALIZED ANXIETY DISORDER): ICD-10-CM

## 2025-04-11 DIAGNOSIS — F33.1 MODERATE EPISODE OF RECURRENT MAJOR DEPRESSIVE DISORDER (HCC): ICD-10-CM

## 2025-04-11 RX ORDER — VENLAFAXINE HYDROCHLORIDE 75 MG/1
75 CAPSULE, EXTENDED RELEASE ORAL DAILY
Qty: 90 CAPSULE | Refills: 1 | Status: SHIPPED | OUTPATIENT
Start: 2025-04-11

## 2025-06-17 ENCOUNTER — TELEPHONE (OUTPATIENT)
Dept: FAMILY MEDICINE CLINIC | Facility: HOSPITAL | Age: 40
End: 2025-06-17